# Patient Record
Sex: FEMALE | Race: WHITE | HISPANIC OR LATINO
[De-identification: names, ages, dates, MRNs, and addresses within clinical notes are randomized per-mention and may not be internally consistent; named-entity substitution may affect disease eponyms.]

---

## 2017-03-17 ENCOUNTER — RECORD ABSTRACTING (OUTPATIENT)
Age: 42
End: 2017-03-17

## 2017-03-17 DIAGNOSIS — Z86.59 PERSONAL HISTORY OF OTHER MENTAL AND BEHAVIORAL DISORDERS: ICD-10-CM

## 2017-03-17 DIAGNOSIS — Z87.440 PERSONAL HISTORY OF URINARY (TRACT) INFECTIONS: ICD-10-CM

## 2017-03-17 DIAGNOSIS — Z78.9 OTHER SPECIFIED HEALTH STATUS: ICD-10-CM

## 2017-03-17 DIAGNOSIS — Z87.898 PERSONAL HISTORY OF OTHER SPECIFIED CONDITIONS: ICD-10-CM

## 2017-03-17 DIAGNOSIS — N32.81 OVERACTIVE BLADDER: ICD-10-CM

## 2017-03-17 DIAGNOSIS — Z80.42 FAMILY HISTORY OF MALIGNANT NEOPLASM OF PROSTATE: ICD-10-CM

## 2017-03-17 DIAGNOSIS — Z86.32 PERSONAL HISTORY OF GESTATIONAL DIABETES: ICD-10-CM

## 2017-03-17 PROBLEM — Z00.00 ENCOUNTER FOR PREVENTIVE HEALTH EXAMINATION: Status: ACTIVE | Noted: 2017-03-17

## 2021-01-05 ENCOUNTER — OUTPATIENT (OUTPATIENT)
Dept: OUTPATIENT SERVICES | Facility: HOSPITAL | Age: 46
LOS: 1 days | Discharge: HOME | End: 2021-01-05

## 2021-01-05 ENCOUNTER — APPOINTMENT (OUTPATIENT)
Dept: PODIATRY | Facility: CLINIC | Age: 46
End: 2021-01-05
Payer: COMMERCIAL

## 2021-01-05 DIAGNOSIS — B35.3 TINEA PEDIS: ICD-10-CM

## 2021-01-05 DIAGNOSIS — L29.9 PRURITUS, UNSPECIFIED: ICD-10-CM

## 2021-01-05 DIAGNOSIS — M79.671 PAIN IN RIGHT FOOT: ICD-10-CM

## 2021-01-05 PROCEDURE — 99203 OFFICE O/P NEW LOW 30 MIN: CPT

## 2021-01-19 RX ORDER — CLOTRIMAZOLE 10 MG/G
1 CREAM TOPICAL 3 TIMES DAILY
Qty: 1 | Refills: 0 | Status: ACTIVE | COMMUNITY
Start: 2021-01-19 | End: 1900-01-01

## 2021-01-25 ENCOUNTER — APPOINTMENT (OUTPATIENT)
Dept: PODIATRY | Facility: CLINIC | Age: 46
End: 2021-01-25
Payer: COMMERCIAL

## 2021-01-25 ENCOUNTER — OUTPATIENT (OUTPATIENT)
Dept: OUTPATIENT SERVICES | Facility: HOSPITAL | Age: 46
LOS: 1 days | Discharge: HOME | End: 2021-01-25

## 2021-01-25 PROCEDURE — 97760 ORTHOTIC MGMT&TRAING 1ST ENC: CPT

## 2021-01-26 NOTE — HISTORY OF PRESENT ILLNESS
[FreeTextEntry1] : Patient presents with complaint Dry flaky skin plantar foot in moccasin distribution B/L consistent with Tinea Pedis\par \par Patient states she also has b/l foot pain at the  heel \par \par Deneies NVFC and No SOB \par

## 2021-01-26 NOTE — PROCEDURE
[] : Both right and left inserts were adjusted to accommodate deformity and offload pressure baring region. Modification  of inserts were performed with use of plastazote, PPT, cork and poron. [FreeTextEntry1] : pt with pain to bilateral forefoooot 2nd to callous buildup. callosities trimmed. ppt added to inserts to increase cushioning. initiated fabrication of orthotics. pt feet measured needs an increased shoe size. pt will return with new shoes/sneakers.

## 2021-01-26 NOTE — ASSESSMENT
[FreeTextEntry1] : Patient examined, history and chart reviewed\par rx clotrimazole to be applied as discussed in detail to b/l feet due to fungal infection\par refuses injection \par will send for inserts \par Follow up in 2 weeks

## 2021-01-26 NOTE — PHYSICAL EXAM
[General Appearance - Alert] : alert [General Appearance - In No Acute Distress] : in no acute distress [Delayed in the Right Toes] : capillary refills normal in right toes [Delayed in the Left Toes] : capillary refills normal in the left toes [2+] : left foot dorsalis pedis 2+ [de-identified] : pain of medial tubercle of calc on palpation  [Skin Turgor] : normal skin turgor [Skin Color & Pigmentation] : normal skin color and pigmentation [] : no rash [Skin Lesions] : no skin lesions [Foot Ulcer] : no foot ulcer [Skin Induration] : no skin induration [Sensation] : the sensory exam was normal to light touch and pinprick [FreeTextEntry1] : dry bilateral plantar patches  [No Focal Deficits] : no focal deficits [Deep Tendon Reflexes (DTR)] : deep tendon reflexes were 2+ and symmetric [Motor Exam] : the motor exam was normal [Oriented To Time, Place, And Person] : oriented to person, place, and time [Impaired Insight] : insight and judgment were intact [Affect] : the affect was normal

## 2021-02-08 ENCOUNTER — APPOINTMENT (OUTPATIENT)
Dept: PODIATRY | Facility: CLINIC | Age: 46
End: 2021-02-08

## 2021-04-12 ENCOUNTER — INPATIENT (INPATIENT)
Facility: HOSPITAL | Age: 46
LOS: 7 days | Discharge: HOME HEALTH | End: 2021-04-20
Attending: INTERNAL MEDICINE | Admitting: INTERNAL MEDICINE
Payer: COMMERCIAL

## 2021-04-12 VITALS
WEIGHT: 240.08 LBS | TEMPERATURE: 99 F | DIASTOLIC BLOOD PRESSURE: 87 MMHG | HEART RATE: 107 BPM | RESPIRATION RATE: 20 BRPM | SYSTOLIC BLOOD PRESSURE: 140 MMHG | OXYGEN SATURATION: 93 %

## 2021-04-12 LAB
ALBUMIN SERPL ELPH-MCNC: 3.8 G/DL — SIGNIFICANT CHANGE UP (ref 3.5–5.2)
ALP SERPL-CCNC: 53 U/L — SIGNIFICANT CHANGE UP (ref 30–115)
ALT FLD-CCNC: 25 U/L — SIGNIFICANT CHANGE UP (ref 0–41)
ANION GAP SERPL CALC-SCNC: 11 MMOL/L — SIGNIFICANT CHANGE UP (ref 7–14)
APTT BLD: 25.7 SEC — LOW (ref 27–39.2)
AST SERPL-CCNC: 39 U/L — SIGNIFICANT CHANGE UP (ref 0–41)
BASOPHILS # BLD AUTO: 0.01 K/UL — SIGNIFICANT CHANGE UP (ref 0–0.2)
BASOPHILS NFR BLD AUTO: 0.2 % — SIGNIFICANT CHANGE UP (ref 0–1)
BILIRUB SERPL-MCNC: <0.2 MG/DL — SIGNIFICANT CHANGE UP (ref 0.2–1.2)
BUN SERPL-MCNC: 12 MG/DL — SIGNIFICANT CHANGE UP (ref 10–20)
CALCIUM SERPL-MCNC: 8.6 MG/DL — SIGNIFICANT CHANGE UP (ref 8.5–10.1)
CHLORIDE SERPL-SCNC: 104 MMOL/L — SIGNIFICANT CHANGE UP (ref 98–110)
CK SERPL-CCNC: 66 U/L — SIGNIFICANT CHANGE UP (ref 0–225)
CK SERPL-CCNC: 83 U/L — SIGNIFICANT CHANGE UP (ref 0–225)
CO2 SERPL-SCNC: 22 MMOL/L — SIGNIFICANT CHANGE UP (ref 17–32)
CREAT SERPL-MCNC: 0.7 MG/DL — SIGNIFICANT CHANGE UP (ref 0.7–1.5)
D DIMER BLD IA.RAPID-MCNC: 123 NG/ML DDU — SIGNIFICANT CHANGE UP (ref 0–230)
EOSINOPHIL # BLD AUTO: 0 K/UL — SIGNIFICANT CHANGE UP (ref 0–0.7)
EOSINOPHIL NFR BLD AUTO: 0 % — SIGNIFICANT CHANGE UP (ref 0–8)
GLUCOSE BLDC GLUCOMTR-MCNC: 192 MG/DL — HIGH (ref 70–99)
GLUCOSE SERPL-MCNC: 184 MG/DL — HIGH (ref 70–99)
HCG SERPL QL: NEGATIVE — SIGNIFICANT CHANGE UP
HCT VFR BLD CALC: 40.8 % — SIGNIFICANT CHANGE UP (ref 37–47)
HGB BLD-MCNC: 13.2 G/DL — SIGNIFICANT CHANGE UP (ref 12–16)
IMM GRANULOCYTES NFR BLD AUTO: 0.2 % — SIGNIFICANT CHANGE UP (ref 0.1–0.3)
INR BLD: 1.07 RATIO — SIGNIFICANT CHANGE UP (ref 0.65–1.3)
LACTATE SERPL-SCNC: 1.2 MMOL/L — SIGNIFICANT CHANGE UP (ref 0.7–2)
LACTATE SERPL-SCNC: 1.3 MMOL/L — SIGNIFICANT CHANGE UP (ref 0.7–2)
LDH SERPL L TO P-CCNC: 286 — HIGH (ref 50–242)
LYMPHOCYTES # BLD AUTO: 0.97 K/UL — LOW (ref 1.2–3.4)
LYMPHOCYTES # BLD AUTO: 19.6 % — LOW (ref 20.5–51.1)
MCHC RBC-ENTMCNC: 28.8 PG — SIGNIFICANT CHANGE UP (ref 27–31)
MCHC RBC-ENTMCNC: 32.4 G/DL — SIGNIFICANT CHANGE UP (ref 32–37)
MCV RBC AUTO: 88.9 FL — SIGNIFICANT CHANGE UP (ref 81–99)
MONOCYTES # BLD AUTO: 0.17 K/UL — SIGNIFICANT CHANGE UP (ref 0.1–0.6)
MONOCYTES NFR BLD AUTO: 3.4 % — SIGNIFICANT CHANGE UP (ref 1.7–9.3)
NEUTROPHILS # BLD AUTO: 3.79 K/UL — SIGNIFICANT CHANGE UP (ref 1.4–6.5)
NEUTROPHILS NFR BLD AUTO: 76.6 % — HIGH (ref 42.2–75.2)
NRBC # BLD: 0 /100 WBCS — SIGNIFICANT CHANGE UP (ref 0–0)
NT-PROBNP SERPL-SCNC: 15 PG/ML — SIGNIFICANT CHANGE UP (ref 0–300)
PLATELET # BLD AUTO: 144 K/UL — SIGNIFICANT CHANGE UP (ref 130–400)
POTASSIUM SERPL-MCNC: 4.4 MMOL/L — SIGNIFICANT CHANGE UP (ref 3.5–5)
POTASSIUM SERPL-SCNC: 4.4 MMOL/L — SIGNIFICANT CHANGE UP (ref 3.5–5)
PROT SERPL-MCNC: 7 G/DL — SIGNIFICANT CHANGE UP (ref 6–8)
PROTHROM AB SERPL-ACNC: 12.3 SEC — SIGNIFICANT CHANGE UP (ref 9.95–12.87)
RBC # BLD: 4.59 M/UL — SIGNIFICANT CHANGE UP (ref 4.2–5.4)
RBC # FLD: 13.6 % — SIGNIFICANT CHANGE UP (ref 11.5–14.5)
SARS-COV-2 RNA SPEC QL NAA+PROBE: DETECTED
SODIUM SERPL-SCNC: 137 MMOL/L — SIGNIFICANT CHANGE UP (ref 135–146)
TROPONIN T SERPL-MCNC: <0.01 NG/ML — SIGNIFICANT CHANGE UP
TROPONIN T SERPL-MCNC: <0.01 NG/ML — SIGNIFICANT CHANGE UP
WBC # BLD: 4.95 K/UL — SIGNIFICANT CHANGE UP (ref 4.8–10.8)
WBC # FLD AUTO: 4.95 K/UL — SIGNIFICANT CHANGE UP (ref 4.8–10.8)

## 2021-04-12 PROCEDURE — 93010 ELECTROCARDIOGRAM REPORT: CPT

## 2021-04-12 PROCEDURE — 99285 EMERGENCY DEPT VISIT HI MDM: CPT

## 2021-04-12 PROCEDURE — 71045 X-RAY EXAM CHEST 1 VIEW: CPT | Mod: 26

## 2021-04-12 PROCEDURE — 99223 1ST HOSP IP/OBS HIGH 75: CPT

## 2021-04-12 RX ORDER — DEXTROSE 50 % IN WATER 50 %
15 SYRINGE (ML) INTRAVENOUS ONCE
Refills: 0 | Status: DISCONTINUED | OUTPATIENT
Start: 2021-04-12 | End: 2021-04-20

## 2021-04-12 RX ORDER — ACETAMINOPHEN 500 MG
975 TABLET ORAL ONCE
Refills: 0 | Status: COMPLETED | OUTPATIENT
Start: 2021-04-12 | End: 2021-04-12

## 2021-04-12 RX ORDER — CHLORHEXIDINE GLUCONATE 213 G/1000ML
1 SOLUTION TOPICAL
Refills: 0 | Status: DISCONTINUED | OUTPATIENT
Start: 2021-04-12 | End: 2021-04-20

## 2021-04-12 RX ORDER — ENOXAPARIN SODIUM 100 MG/ML
40 INJECTION SUBCUTANEOUS EVERY 12 HOURS
Refills: 0 | Status: DISCONTINUED | OUTPATIENT
Start: 2021-04-12 | End: 2021-04-20

## 2021-04-12 RX ORDER — SODIUM CHLORIDE 9 MG/ML
1000 INJECTION, SOLUTION INTRAVENOUS
Refills: 0 | Status: DISCONTINUED | OUTPATIENT
Start: 2021-04-12 | End: 2021-04-20

## 2021-04-12 RX ORDER — REMDESIVIR 5 MG/ML
INJECTION INTRAVENOUS
Refills: 0 | Status: COMPLETED | OUTPATIENT
Start: 2021-04-12 | End: 2021-04-16

## 2021-04-12 RX ORDER — INSULIN LISPRO 100/ML
8 VIAL (ML) SUBCUTANEOUS
Refills: 0 | Status: DISCONTINUED | OUTPATIENT
Start: 2021-04-12 | End: 2021-04-20

## 2021-04-12 RX ORDER — GLUCAGON INJECTION, SOLUTION 0.5 MG/.1ML
1 INJECTION, SOLUTION SUBCUTANEOUS ONCE
Refills: 0 | Status: DISCONTINUED | OUTPATIENT
Start: 2021-04-12 | End: 2021-04-20

## 2021-04-12 RX ORDER — GUAIFENESIN/DEXTROMETHORPHAN 600MG-30MG
10 TABLET, EXTENDED RELEASE 12 HR ORAL EVERY 4 HOURS
Refills: 0 | Status: DISCONTINUED | OUTPATIENT
Start: 2021-04-12 | End: 2021-04-20

## 2021-04-12 RX ORDER — DEXTROSE 50 % IN WATER 50 %
25 SYRINGE (ML) INTRAVENOUS ONCE
Refills: 0 | Status: DISCONTINUED | OUTPATIENT
Start: 2021-04-12 | End: 2021-04-20

## 2021-04-12 RX ORDER — CITALOPRAM 10 MG/1
10 TABLET, FILM COATED ORAL DAILY
Refills: 0 | Status: DISCONTINUED | OUTPATIENT
Start: 2021-04-12 | End: 2021-04-20

## 2021-04-12 RX ORDER — DEXAMETHASONE 0.5 MG/5ML
6 ELIXIR ORAL DAILY
Refills: 0 | Status: DISCONTINUED | OUTPATIENT
Start: 2021-04-12 | End: 2021-04-20

## 2021-04-12 RX ORDER — INSULIN GLARGINE 100 [IU]/ML
25 INJECTION, SOLUTION SUBCUTANEOUS AT BEDTIME
Refills: 0 | Status: DISCONTINUED | OUTPATIENT
Start: 2021-04-12 | End: 2021-04-20

## 2021-04-12 RX ORDER — ACETAMINOPHEN 500 MG
650 TABLET ORAL EVERY 4 HOURS
Refills: 0 | Status: DISCONTINUED | OUTPATIENT
Start: 2021-04-12 | End: 2021-04-13

## 2021-04-12 RX ORDER — LOSARTAN POTASSIUM 100 MG/1
50 TABLET, FILM COATED ORAL DAILY
Refills: 0 | Status: DISCONTINUED | OUTPATIENT
Start: 2021-04-12 | End: 2021-04-20

## 2021-04-12 RX ORDER — REMDESIVIR 5 MG/ML
100 INJECTION INTRAVENOUS EVERY 24 HOURS
Refills: 0 | Status: COMPLETED | OUTPATIENT
Start: 2021-04-13 | End: 2021-04-16

## 2021-04-12 RX ORDER — PANTOPRAZOLE SODIUM 20 MG/1
40 TABLET, DELAYED RELEASE ORAL
Refills: 0 | Status: DISCONTINUED | OUTPATIENT
Start: 2021-04-12 | End: 2021-04-20

## 2021-04-12 RX ORDER — REMDESIVIR 5 MG/ML
200 INJECTION INTRAVENOUS EVERY 24 HOURS
Refills: 0 | Status: COMPLETED | OUTPATIENT
Start: 2021-04-12 | End: 2021-04-12

## 2021-04-12 RX ORDER — INSULIN LISPRO 100/ML
VIAL (ML) SUBCUTANEOUS
Refills: 0 | Status: DISCONTINUED | OUTPATIENT
Start: 2021-04-12 | End: 2021-04-20

## 2021-04-12 RX ORDER — DEXTROSE 50 % IN WATER 50 %
12.5 SYRINGE (ML) INTRAVENOUS ONCE
Refills: 0 | Status: DISCONTINUED | OUTPATIENT
Start: 2021-04-12 | End: 2021-04-20

## 2021-04-12 RX ADMIN — Medication 975 MILLIGRAM(S): at 19:00

## 2021-04-12 RX ADMIN — REMDESIVIR 500 MILLIGRAM(S): 5 INJECTION INTRAVENOUS at 22:26

## 2021-04-12 RX ADMIN — Medication 975 MILLIGRAM(S): at 18:22

## 2021-04-12 NOTE — ED PROVIDER NOTE - CLINICAL SUMMARY MEDICAL DECISION MAKING FREE TEXT BOX
46 yo female PMH Dm, HTN  c/o cough and SOB since yesterday.  Patient was exposed to COVID-19 9 days ago when a family friend came over for a visit.  Patient developed symptoms a few days later which included fever and headache, she tested positive about a week ago, yesterday developed  cough and SOB.  Reports diarrhea as well, but this is after her mother gave her a laxative.  Denies hemoptysis, sore throat, abdominal pain, no leg pain swelling., no N or V.   Well-nourished middle aged female in  NAD, head AT/NC, PERRL, pink conjunctivae,  mmm, nml oropharynx, nml phonation without drooling or trismus, supple neck without midline spine ttp, mild tachypnes, no accessory muscle use, speaking full sentences, equal air entry, b/l inspiratory crackles, tachycardia, well-perfused extremities, distal pulses intact, abdomen soft, NT/ND, BS present in all quadrants, no midline spine or CVA ttp, no leg edema or unilateral calf swelling, A&Ox3, no focal neuro deficits, nml mood and affect.  Oxygen saturation at rest is 88-89% on RA, patient has tachypnea, needs admission,  put in 2L oxygen via NC.

## 2021-04-12 NOTE — H&P ADULT - ATTENDING COMMENTS
46 YO F with a PMH of HTN and COVID19+ (4/6) who presents to the hospital with a c/o progressively worsening SOB for the past x 1 week. Associated with fevers and non-productive cough. Denies any runny nose, sore throat, rashes, CP, palpitations, LE swelling, N/V/D, ABD pain, and dysuria. +/- sick contacts. +/- recent travel. In the ED, Chest X-ray with B/L airspace opacities. Hypoxic to 89% on RA, placed on NC. Isolated and COVID19 swab was positive.     Physical exam shows pt in NAD. VSS, afebrile, not hypoxic on 3L NC. A&Ox3. Non-focal neuro exam. Muscle strength/sensation intact. CTA B/L with no W/C/R. RRR, no M/G/R. ABD is soft and non-tender, normoactive BSs. LEs without swelling. No rashes. Labs and radiology as above.     SOB + Fevers + Cough due to COVID19 pneumonia + acute hypoxic respiratory failure, sepsis present on admission (HR + Temp + source + ebnd organ [AHRF]). - recent travel. - COVID19 contact. Admit to COVID19 isolation unit. Send Coags, CRP, procal, D-dimer, and LDH. Trend CBC, Ck, and LFTs. Send ferritin and fibrinogen. FU official Chest XR report. IV Steroids. Remdesivir/Plasma protocol. Plasma Abs. APAP PRN. Anti-tussives PRN. Supplemental O2 PRN. Prone pt as tolerated. AC as per [facility] COVID protocol. ID Consult.     Hyperglycemia. A1c. FSs. Insulin PRN.     Hx of HTN. Restart home meds, except as stated above. DVT PPX. Inform PCP of pt's admission to hospital. My note supersedes the residents note.

## 2021-04-12 NOTE — H&P ADULT - NSHPLABSRESULTS_GEN_ALL_CORE
13.2   4.95  )-----------( 144      ( 12 Apr 2021 15:00 )             40.8     04-12    137  |  104  |  12  ----------------------------<  184<H>  4.4   |  22  |  0.7    Ca    8.6      12 Apr 2021 15:00    TPro  7.0  /  Alb  3.8  /  TBili  <0.2  /  DBili  x   /  AST  39  /  ALT  25  /  AlkPhos  53  04-12    D-Dimer Assay, Quantitative: 123: Manufacturers recommended Cut off for VTE is 230 ng/ml D-DU ng/mL DDU (04.12.21 @ 15:00)    PT/INR - ( 12 Apr 2021 15:00 )   PT: 12.30 sec;   INR: 1.07 ratio         PTT - ( 12 Apr 2021 15:00 )  PTT:25.7 sec    Lactate, Blood: 1.3 mmol/L (04.12.21 @ 15:00)    Creatine Kinase, Serum: 83: Hemolyzed. Interpret with caution U/L (04.12.21 @ 15:00)    Troponin T, Serum: <0.01: Hemolyzed. Interpret with caution ng/mL (04.12.21 @ 15:00)    Serum Pro-Brain Natriuretic Peptide: 15 pg/mL (04.12.21 @ 15:00)    COVID-19 PCR: Detected: You can help in the fight against COVID-19. [facility] may contact  you to see if you are interested in voluntarily participating in one of  our clinical trials.  This test has been validated by [employer] to be accurate;  though it has not been FDA cleared/approved by the usual pathway  As with all laboratory test, results should be correlated with clinical  findings.  https://www.fda.gov/media/951876/download  https://www.fda.gov/media/665686/download (04.12.21 @ 14:33)    < from: 12 Lead ECG (04.12.21 @ 15:16) >    Diagnosis Line Sinus tachycardia  Right axis deviation  Cannot rule out Anterior infarct , age undetermined  Abnormal ECG    < end of copied text >

## 2021-04-12 NOTE — ED PROVIDER NOTE - OBJECTIVE STATEMENT
46 y/o F PMHx DM, HTN presents to ED with worsening COVID 19 sx. Pt was diagnosed with COVID on 4/6/21. Pt symptoms include diffuse headache, myalgias, cough, congestion, diarrhea, fevers. Pt reports pleuritic chest pain and SOB worsening over past 24 hours. Pt hypoxic to 88% on RA in ED. No hemoptysis, leg swelling, OCP (pt has IUD).

## 2021-04-12 NOTE — ED PROVIDER NOTE - ATTENDING CONTRIBUTION TO CARE
44 yo female PMH Dm, HTN  c/o cough and SOB since yesterday.  Patient was exposed to COVID-19 9 days ago when a family friend came over for a visit.  Patient developed symptoms a few days later which included fever and headache, she tested positive about a week ago, yesterday developed  cough and SOB.  Reports diarrhea as well, but this is after her mother gave her a laxative.  Denies hemoptysis, sore throat, abdominal pain, no leg pain swelling., no N or V.   Well-nourished middle aged female in  NAD, head AT/NC, PERRL, pink conjunctivae,  mmm, nml oropharynx, nml phonation without drooling or trismus, supple neck without midline spine ttp, mild tachypnes, no accessory muscle use, speaking full sentences, equal air entry, b/l inspiratory crackles, tachycardia, well-perfused extremities, distal pulses intact, abdomen soft, NT/ND, BS present in all quadrants, no midline spine or CVA ttp, no leg edema or unilateral calf swelling, A&Ox3, no focal neuro deficits, nml mood and affect.  Oxygen saturation at rest is 88-89% on RA, patient has tachypnea, needs admission,  put in 2L oxygen via NC.

## 2021-04-12 NOTE — H&P ADULT - NSHPPHYSICALEXAM_GEN_ALL_CORE
PHYSICAL EXAM:  GENERAL: NAD, lying in bed comfortably, on 2L NC  HEAD:  Atraumatic, Normocephalic  EYES: EOMI, PERRLA, conjunctiva and sclera clear  ENT: Moist mucous membranes  NECK: Supple, No JVD  CHEST/LUNG: b/l rhonchi  HEART: tachycardic, regular rhythm; No murmurs, rubs, or gallops  ABDOMEN: Bowel sounds present; Soft, Nontender, Nondistended. No hepatomegaly  EXTREMITIES:  2+ Peripheral Pulses, brisk capillary refill. No clubbing, cyanosis, or edema  NERVOUS SYSTEM:  Alert & Oriented X3, speech clear. No deficits   MSK: FROM all 4 extremities, full and equal strength  SKIN: No rashes or lesions

## 2021-04-12 NOTE — H&P ADULT - HISTORY OF PRESENT ILLNESS
45 Y F with pmh, HTN, COVID19 positive on 4/6/21 presents to ED with worsening shortness of breath. Per patient, she had exposure to COVID on 4/3, ended up testing positive on 4/6. On 4/6, she began having cough, congestion, myalgias, fatigue, and subjective fevers. Went to PMD, started on z-pack (completed course) which helped her feel better slightly. However, last night she began having worsening shortness of breath and dry cough, to the point that she was vomiting from coughing, so decided to come to ED. Denies chills, chest pain, palpitations, abdominal pain. Reports diarrhea, but only after she took a laxative.     ED Course:  T 99.4F, P 107, /87, R 20, S 93% on RA at rest. Desaturates to 89% on RA on ambulation, put on 3L NC and improved to 99%.

## 2021-04-12 NOTE — H&P ADULT - ASSESSMENT
45 Y F with pmh, HTN, COVID19 positive on 4/6/21 presents to ED with worsening shortness of breath. Admitted for acute hypoxemic respiratory failure due to COVID19 PNA.    #Acute hypoxemic respiratory failure due to COVID19 PNA  -saturating well on 2L NC   -Patient started on Decadron 6mg IV daily 10more days  -Remdesivir 200mg now then 100mg taper   -ID consult  -Continue airborne isolation   -Please monitor O2 sat q8 and vitals  -Please follow up inflammatory markers (D-dimer, CRP, ferritin) Q48-72H if clinically worsening  -Incentive spirometry at bedside  -Titrate supplemental O2 to maintain SpO2 92-96%  -Tylenol PRN  -mucinex prn    #HTN  -continue losartan    #DM  -basal/bolus  -a1c  -avoid hypoglycemia    #DVT PPx- LVX 40mg sq bid  #GI PPx- Protonix 40mg po qam  #Diet- DASH/TLC/CC  #CHG  #Activity- AAT  #Dispo- Acute  #Code- FULL

## 2021-04-12 NOTE — ED PROVIDER NOTE - NS ED ROS FT
Review of Systems:  CONSTITUTIONAL: +fever, No diaphoresis   SKIN: No rash  HEMATOLOGIC: No abnormal bleeding or bruising  EYES: No eye pain, No blurred vision  ENT: No sore throat, No neck pain, No rhinorrhea, +congestion   RESPIRATORY: +shortness of breath, +cough  CARDIAC: +chest pain, No palpitations  GI: No abdominal pain, No nausea, No vomiting, +diarrhea, No constipation, No bright red blood per rectum or melena. No flank pain  : No dysuria, frequency, hematuria.   MUSCULOSKELETAL: No joint paint, No swelling, No back pain, +myalgias   NEUROLOGIC: No numbness, No focal weakness, No headache, No dizziness  All other systems negative, unless specified in HPI

## 2021-04-13 LAB
A1C WITH ESTIMATED AVERAGE GLUCOSE RESULT: 7.1 % — HIGH (ref 4–5.6)
ABO RH CONFIRMATION: SIGNIFICANT CHANGE UP
ALBUMIN SERPL ELPH-MCNC: 3.5 G/DL — SIGNIFICANT CHANGE UP (ref 3.5–5.2)
ALP SERPL-CCNC: 48 U/L — SIGNIFICANT CHANGE UP (ref 30–115)
ALT FLD-CCNC: 24 U/L — SIGNIFICANT CHANGE UP (ref 0–41)
ANION GAP SERPL CALC-SCNC: 13 MMOL/L — SIGNIFICANT CHANGE UP (ref 7–14)
ANISOCYTOSIS BLD QL: SLIGHT — SIGNIFICANT CHANGE UP
AST SERPL-CCNC: 36 U/L — SIGNIFICANT CHANGE UP (ref 0–41)
BASOPHILS # BLD AUTO: 0 K/UL — SIGNIFICANT CHANGE UP (ref 0–0.2)
BASOPHILS NFR BLD AUTO: 0 % — SIGNIFICANT CHANGE UP (ref 0–1)
BILIRUB SERPL-MCNC: 0.2 MG/DL — SIGNIFICANT CHANGE UP (ref 0.2–1.2)
BLD GP AB SCN SERPL QL: SIGNIFICANT CHANGE UP
BUN SERPL-MCNC: 10 MG/DL — SIGNIFICANT CHANGE UP (ref 10–20)
CALCIUM SERPL-MCNC: 8 MG/DL — LOW (ref 8.5–10.1)
CHLORIDE SERPL-SCNC: 102 MMOL/L — SIGNIFICANT CHANGE UP (ref 98–110)
CO2 SERPL-SCNC: 21 MMOL/L — SIGNIFICANT CHANGE UP (ref 17–32)
COVID-19 SPIKE DOMAIN AB INTERP: NEGATIVE — SIGNIFICANT CHANGE UP
COVID-19 SPIKE DOMAIN ANTIBODY RESULT: 0.4 U/ML — SIGNIFICANT CHANGE UP
CREAT SERPL-MCNC: 0.7 MG/DL — SIGNIFICANT CHANGE UP (ref 0.7–1.5)
CRP SERPL-MCNC: 52 MG/L — HIGH
EOSINOPHIL # BLD AUTO: 0 K/UL — SIGNIFICANT CHANGE UP (ref 0–0.7)
EOSINOPHIL NFR BLD AUTO: 0 % — SIGNIFICANT CHANGE UP (ref 0–8)
ESTIMATED AVERAGE GLUCOSE: 157 MG/DL — HIGH (ref 68–114)
FERRITIN SERPL-MCNC: 566 NG/ML — HIGH (ref 15–150)
GIANT PLATELETS BLD QL SMEAR: PRESENT — SIGNIFICANT CHANGE UP
GLUCOSE BLDC GLUCOMTR-MCNC: 131 MG/DL — HIGH (ref 70–99)
GLUCOSE BLDC GLUCOMTR-MCNC: 183 MG/DL — HIGH (ref 70–99)
GLUCOSE BLDC GLUCOMTR-MCNC: 199 MG/DL — HIGH (ref 70–99)
GLUCOSE BLDC GLUCOMTR-MCNC: 256 MG/DL — HIGH (ref 70–99)
GLUCOSE BLDC GLUCOMTR-MCNC: 276 MG/DL — HIGH (ref 70–99)
GLUCOSE SERPL-MCNC: 200 MG/DL — HIGH (ref 70–99)
HCT VFR BLD CALC: 39.4 % — SIGNIFICANT CHANGE UP (ref 37–47)
HGB BLD-MCNC: 12.5 G/DL — SIGNIFICANT CHANGE UP (ref 12–16)
HYPOCHROMIA BLD QL: SLIGHT — SIGNIFICANT CHANGE UP
LYMPHOCYTES # BLD AUTO: 0.63 K/UL — LOW (ref 1.2–3.4)
LYMPHOCYTES # BLD AUTO: 14.2 % — LOW (ref 20.5–51.1)
MAGNESIUM SERPL-MCNC: 1.8 MG/DL — SIGNIFICANT CHANGE UP (ref 1.8–2.4)
MANUAL SMEAR VERIFICATION: SIGNIFICANT CHANGE UP
MCHC RBC-ENTMCNC: 28.5 PG — SIGNIFICANT CHANGE UP (ref 27–31)
MCHC RBC-ENTMCNC: 31.7 G/DL — LOW (ref 32–37)
MCV RBC AUTO: 90 FL — SIGNIFICANT CHANGE UP (ref 81–99)
MICROCYTES BLD QL: SLIGHT — SIGNIFICANT CHANGE UP
MONOCYTES # BLD AUTO: 0.16 K/UL — SIGNIFICANT CHANGE UP (ref 0.1–0.6)
MONOCYTES NFR BLD AUTO: 3.6 % — SIGNIFICANT CHANGE UP (ref 1.7–9.3)
NEUTROPHILS # BLD AUTO: 3.49 K/UL — SIGNIFICANT CHANGE UP (ref 1.4–6.5)
NEUTROPHILS NFR BLD AUTO: 75.2 % — SIGNIFICANT CHANGE UP (ref 42.2–75.2)
NEUTS BAND # BLD: 3.5 % — SIGNIFICANT CHANGE UP (ref 0–6)
PLAT MORPH BLD: NORMAL — SIGNIFICANT CHANGE UP
PLATELET # BLD AUTO: 136 K/UL — SIGNIFICANT CHANGE UP (ref 130–400)
POLYCHROMASIA BLD QL SMEAR: SLIGHT — SIGNIFICANT CHANGE UP
POTASSIUM SERPL-MCNC: 3.7 MMOL/L — SIGNIFICANT CHANGE UP (ref 3.5–5)
POTASSIUM SERPL-SCNC: 3.7 MMOL/L — SIGNIFICANT CHANGE UP (ref 3.5–5)
PROCALCITONIN SERPL-MCNC: 0.11 NG/ML — HIGH (ref 0.02–0.1)
PROT SERPL-MCNC: 6.5 G/DL — SIGNIFICANT CHANGE UP (ref 6–8)
RBC # BLD: 4.38 M/UL — SIGNIFICANT CHANGE UP (ref 4.2–5.4)
RBC # FLD: 13.6 % — SIGNIFICANT CHANGE UP (ref 11.5–14.5)
RBC BLD AUTO: ABNORMAL
SARS-COV-2 IGG+IGM SERPL QL IA: 0.4 U/ML — SIGNIFICANT CHANGE UP
SARS-COV-2 IGG+IGM SERPL QL IA: NEGATIVE — SIGNIFICANT CHANGE UP
SMUDGE CELLS # BLD: PRESENT — SIGNIFICANT CHANGE UP
SODIUM SERPL-SCNC: 136 MMOL/L — SIGNIFICANT CHANGE UP (ref 135–146)
VARIANT LYMPHS # BLD: 3.5 % — SIGNIFICANT CHANGE UP (ref 0–5)
WBC # BLD: 4.43 K/UL — LOW (ref 4.8–10.8)
WBC # FLD AUTO: 4.43 K/UL — LOW (ref 4.8–10.8)

## 2021-04-13 PROCEDURE — 99233 SBSQ HOSP IP/OBS HIGH 50: CPT

## 2021-04-13 PROCEDURE — 71045 X-RAY EXAM CHEST 1 VIEW: CPT | Mod: 26

## 2021-04-13 RX ORDER — IBUPROFEN 200 MG
400 TABLET ORAL ONCE
Refills: 0 | Status: COMPLETED | OUTPATIENT
Start: 2021-04-13 | End: 2021-04-13

## 2021-04-13 RX ORDER — ACETAMINOPHEN 500 MG
650 TABLET ORAL EVERY 6 HOURS
Refills: 0 | Status: DISCONTINUED | OUTPATIENT
Start: 2021-04-13 | End: 2021-04-20

## 2021-04-13 RX ADMIN — Medication 400 MILLIGRAM(S): at 21:53

## 2021-04-13 RX ADMIN — ENOXAPARIN SODIUM 40 MILLIGRAM(S): 100 INJECTION SUBCUTANEOUS at 17:12

## 2021-04-13 RX ADMIN — Medication 6 MILLIGRAM(S): at 05:27

## 2021-04-13 RX ADMIN — REMDESIVIR 500 MILLIGRAM(S): 5 INJECTION INTRAVENOUS at 21:52

## 2021-04-13 RX ADMIN — Medication 8 UNIT(S): at 11:41

## 2021-04-13 RX ADMIN — Medication 650 MILLIGRAM(S): at 06:30

## 2021-04-13 RX ADMIN — ENOXAPARIN SODIUM 40 MILLIGRAM(S): 100 INJECTION SUBCUTANEOUS at 05:27

## 2021-04-13 RX ADMIN — CITALOPRAM 10 MILLIGRAM(S): 10 TABLET, FILM COATED ORAL at 11:41

## 2021-04-13 RX ADMIN — Medication 400 MILLIGRAM(S): at 22:23

## 2021-04-13 RX ADMIN — Medication 650 MILLIGRAM(S): at 02:48

## 2021-04-13 RX ADMIN — Medication 3: at 17:12

## 2021-04-13 RX ADMIN — CHLORHEXIDINE GLUCONATE 1 APPLICATION(S): 213 SOLUTION TOPICAL at 05:27

## 2021-04-13 RX ADMIN — Medication 650 MILLIGRAM(S): at 08:00

## 2021-04-13 RX ADMIN — INSULIN GLARGINE 25 UNIT(S): 100 INJECTION, SOLUTION SUBCUTANEOUS at 22:00

## 2021-04-13 RX ADMIN — Medication 3: at 11:41

## 2021-04-13 RX ADMIN — LOSARTAN POTASSIUM 50 MILLIGRAM(S): 100 TABLET, FILM COATED ORAL at 05:28

## 2021-04-13 RX ADMIN — PANTOPRAZOLE SODIUM 40 MILLIGRAM(S): 20 TABLET, DELAYED RELEASE ORAL at 05:28

## 2021-04-13 RX ADMIN — Medication 650 MILLIGRAM(S): at 02:18

## 2021-04-13 RX ADMIN — Medication 8 UNIT(S): at 17:11

## 2021-04-13 RX ADMIN — Medication 1: at 07:42

## 2021-04-13 RX ADMIN — Medication 8 UNIT(S): at 07:42

## 2021-04-13 NOTE — CONSULT NOTE ADULT - SUBJECTIVE AND OBJECTIVE BOX
LIZ PAULSON  45y, Female  Allergy: No Known Allergies      CHIEF COMPLAINT:   shortness of breath (12 Apr 2021 18:16)      LOS  1d    HPI  HPI:  44 yo F with  HTN, COVID19 positive on 4/6/21 presents to ED with worsening shortness of breath. Per patient, she had exposure to COVID on 4/3, ended up testing positive on 4/6. On 4/6, she began having cough, congestion, myalgias, fatigue, and subjective fevers. Went to PMD, started on z-pack (completed course) which helped her feel better slightly. However, last night she began having worsening shortness of breath and dry cough, to the point that she was vomiting from coughing, so decided to come to ED. Denies chills, chest pain, palpitations, abdominal pain. Reports diarrhea, but only after she took a laxative.     ED Course:  T 99.4F, P 107, /87, R 20, S 93% on RA at rest. Desaturates to 89% on RA on ambulation, put on 3L NC and improved to 99%.  (12 Apr 2021 18:16)      INFECTIOUS DISEASE HISTORY:  Satting 90 % RA  CXR bilateral opacities   C-Reactive Protein, Serum: 52 mg/L (04-12-21 @ 15:00)  D-Dimer Assay, Quantitative: 123 ng/mL DDU (04-12-21 @ 15:00)      PMH  PAST MEDICAL & SURGICAL HISTORY:  Hypertension    Diabetes mellitus        FAMILY HISTORY      SOCIAL HISTORY  Social History:  lives with boyfriend and 2 kids  remote history of smoking as teen  social alcohol  no drugs (12 Apr 2021 18:16)        ROS  General: Denies rigors, nightsweats  HEENT: Denies headache, rhinorrhea, sore throat, eye pain  CV: Denies CP, palpitations  PULM: Denies wheezing, hemoptysis  GI: Denies hematemesis, hematochezia, melena  : Denies discharge, hematuria  MSK: Denies arthralgias, myalgias  SKIN: Denies rash, lesions  NEURO: Denies paresthesias, weakness  PSYCH: Denies depression, anxiety    VITALS:  T(F): 99.2, Max: 101.5 (04-13-21 @ 02:18)  HR: 102  BP: 123/63  RR: 18Vital Signs Last 24 Hrs  T(C): 37.3 (13 Apr 2021 07:11), Max: 38.6 (12 Apr 2021 17:54)  T(F): 99.2 (13 Apr 2021 07:11), Max: 101.5 (13 Apr 2021 02:18)  HR: 102 (13 Apr 2021 05:00) (83 - 112)  BP: 123/63 (13 Apr 2021 05:00) (123/63 - 140/87)  BP(mean): --  RR: 18 (13 Apr 2021 05:00) (18 - 22)  SpO2: 90% (13 Apr 2021 05:00) (89% - 99%)      TESTS & MEASUREMENTS:                        13.2   4.95  )-----------( 144      ( 12 Apr 2021 15:00 )             40.8     04-12    137  |  104  |  12  ----------------------------<  184<H>  4.4   |  22  |  0.7    Ca    8.6      12 Apr 2021 15:00    TPro  7.0  /  Alb  3.8  /  TBili  <0.2  /  DBili  x   /  AST  39  /  ALT  25  /  AlkPhos  53  04-12    eGFR if Non African American: 105 mL/min/1.73M2 (04-12-21 @ 15:00)  eGFR if African American: 122 mL/min/1.73M2 (04-12-21 @ 15:00)    LIVER FUNCTIONS - ( 12 Apr 2021 15:00 )  Alb: 3.8 g/dL / Pro: 7.0 g/dL / ALK PHOS: 53 U/L / ALT: 25 U/L / AST: 39 U/L / GGT: x                 Lactate, Blood: 1.2 mmol/L (04-12-21 @ 21:55)  Lactate, Blood: 1.3 mmol/L (04-12-21 @ 15:00)      INFECTIOUS DISEASES TESTING  COVID-19 PCR: Detected (04-12-21 @ 14:33)      INFLAMMATORY MARKERS  C-Reactive Protein, Serum: 52 mg/L (04-12-21 @ 15:00)      RADIOLOGY & ADDITIONAL TESTS:  I have personally reviewed the last Chest xray  CXR      CT      CARDIOLOGY TESTING  12 Lead ECG:   Ventricular Rate 104 BPM    Atrial Rate 104 BPM    P-R Interval 144 ms    QRS Duration 90 ms    Q-T Interval 344 ms    QTC Calculation(Bazett) 452 ms    P Axis 60 degrees    R Axis 116 degrees    T Axis 24 degrees    Diagnosis Line Sinus tachycardia  Right axis deviation  Cannot rule out Anterior infarct , age undetermined  Abnormal ECG    Confirmed by ZAHRAA NUNES MD ([id number]) on 4/12/2021 4:16:24 PM (04-12-21 @ 15:16)      MEDICATIONS  chlorhexidine 4% Liquid 1 Topical <User Schedule>  citalopram 10 Oral daily  dexAMETHasone  Injectable 6 IV Push daily  dextrose 40% Gel 15 Oral once  dextrose 5%. 1000 IV Continuous <Continuous>  dextrose 5%. 1000 IV Continuous <Continuous>  dextrose 50% Injectable 25 IV Push once  dextrose 50% Injectable 12.5 IV Push once  dextrose 50% Injectable 25 IV Push once  enoxaparin Injectable 40 SubCutaneous every 12 hours  glucagon  Injectable 1 IntraMuscular once  insulin glargine Injectable (LANTUS) 25 SubCutaneous at bedtime  insulin lispro (ADMELOG) corrective regimen sliding scale  SubCutaneous three times a day before meals  insulin lispro Injectable (ADMELOG) 8 SubCutaneous three times a day before meals  losartan 50 Oral daily  pantoprazole    Tablet 40 Oral before breakfast  remdesivir  IVPB  IV Intermittent   remdesivir  IVPB 100 IV Intermittent every 24 hours      Weight  Weight (kg): 108.9 (04-12-21 @ 21:44)    ANTIBIOTICS:  remdesivir  IVPB   IV Intermittent   remdesivir  IVPB 100 milliGRAM(s) IV Intermittent every 24 hours      ALLERGIES:  No Known Allergies

## 2021-04-13 NOTE — PROGRESS NOTE ADULT - SUBJECTIVE AND OBJECTIVE BOX
LIZ PAULSON  45y  [facility] T[facility] 012 B      Patient is a 45y old  Female who presents with a chief complaint of shortness of breath (13 Apr 2021 08:55)      INTERVAL HPI/OVERNIGHT EVENTS:    no acute events overnight     REVIEW OF SYSTEMS:  CONSTITUTIONAL: + fatigue  EYES: No eye pain, visual disturbances, or discharge  ENMT:  No difficulty hearing, tinnitus, vertigo; No sinus or throat pain  NECK: No pain or stiffness  BREASTS: No pain, masses, or nipple discharge  RESPIRATORY: No cough, wheezing, chills or hemoptysis; No shortness of breath  CARDIOVASCULAR: No chest pain, palpitations, dizziness, or leg swelling  GASTROINTESTINAL: No abdominal or epigastric pain. No nausea, vomiting, or hematemesis; No diarrhea or constipation. No melena or hematochezia.  GENITOURINARY: No dysuria, frequency, hematuria, or incontinence  NEUROLOGICAL: No headaches, memory loss, loss of strength, numbness, or tremors  SKIN: No itching, burning, rashes, or lesions   LYMPH NODES: No enlarged glands  ENDOCRINE: No heat or cold intolerance; No hair loss  MUSCULOSKELETAL: No joint pain or swelling; No muscle, back, or extremity pain  PSYCHIATRIC: No depression, anxiety, mood swings, or difficulty sleeping  HEME/LYMPH: No easy bruising, or bleeding gums  ALLERY AND IMMUNOLOGIC: No hives or eczema  FAMILY HISTORY:    T(C): 37.3 (04-13-21 @ 07:11), Max: 38.6 (04-12-21 @ 17:54)  HR: 102 (04-13-21 @ 05:00) (83 - 112)  BP: 123/63 (04-13-21 @ 05:00) (123/63 - 140/87)  RR: 18 (04-13-21 @ 05:00) (18 - 22)  SpO2: 93% (04-13-21 @ 07:33) (89% - 99%)  Wt(kg): --Vital Signs Last 24 Hrs  T(C): 37.3 (13 Apr 2021 07:11), Max: 38.6 (12 Apr 2021 17:54)  T(F): 99.2 (13 Apr 2021 07:11), Max: 101.5 (13 Apr 2021 02:18)  HR: 102 (13 Apr 2021 05:00) (83 - 112)  BP: 123/63 (13 Apr 2021 05:00) (123/63 - 140/87)  BP(mean): --  RR: 18 (13 Apr 2021 05:00) (18 - 22)  SpO2: 93% (13 Apr 2021 07:33) (89% - 99%)    PHYSICAL EXAM:  GENERAL: NAD, well-groomed, well-developed  HEAD:  Atraumatic, Normocephalic  EYES: EOMI, PERRLA, conjunctiva and sclera clear  ENMT: No tonsillar erythema, exudates, or enlargement; Moist mucous membranes, Good dentition, No lesions  NECK: Supple, No JVD, Normal thyroid  NERVOUS SYSTEM:  Alert & Oriented X3, Good concentration; Motor Strength 5/5 B/L upper and lower extremities; DTRs 2+ intact and symmetric  PULM: Clear to auscultation bilaterally  CARDIAC: Regular rate and rhythm; No murmurs, rubs, or gallops  GI: Soft, Nontender, Nondistended; Bowel sounds present  EXTREMITIES:  2+ Peripheral Pulses, No clubbing, cyanosis, or edema  LYMPH: No lymphadenopathy noted  SKIN: No rashes or lesions    Consultant(s) Notes Reviewed:  [x ] YES  [ ] NO  Care Discussed with Consultants/Other Providers [ x] YES  [ ] NO    LABS:                            12.5   4.43  )-----------( 136      ( 13 Apr 2021 06:50 )             39.4   04-13    136  |  102  |  10  ----------------------------<  200<H>  3.7   |  21  |  0.7    Ca    8.0<L>      13 Apr 2021 06:50  Mg     1.8     04-13    TPro  6.5  /  Alb  3.5  /  TBili  0.2  /  DBili  x   /  AST  36  /  ALT  24  /  AlkPhos  48  04-13            acetaminophen   Tablet .. 650 milliGRAM(s) Oral every 4 hours PRN  chlorhexidine 4% Liquid 1 Application(s) Topical <User Schedule>  citalopram 10 milliGRAM(s) Oral daily  dexAMETHasone  Injectable 6 milliGRAM(s) IV Push daily  dextrose 40% Gel 15 Gram(s) Oral once  dextrose 5%. 1000 milliLiter(s) IV Continuous <Continuous>  dextrose 5%. 1000 milliLiter(s) IV Continuous <Continuous>  dextrose 50% Injectable 25 Gram(s) IV Push once  dextrose 50% Injectable 12.5 Gram(s) IV Push once  dextrose 50% Injectable 25 Gram(s) IV Push once  enoxaparin Injectable 40 milliGRAM(s) SubCutaneous every 12 hours  glucagon  Injectable 1 milliGRAM(s) IntraMuscular once  guaifenesin/dextromethorphan  Syrup 10 milliLiter(s) Oral every 4 hours PRN  insulin glargine Injectable (LANTUS) 25 Unit(s) SubCutaneous at bedtime  insulin lispro (ADMELOG) corrective regimen sliding scale   SubCutaneous three times a day before meals  insulin lispro Injectable (ADMELOG) 8 Unit(s) SubCutaneous three times a day before meals  losartan 50 milliGRAM(s) Oral daily  pantoprazole    Tablet 40 milliGRAM(s) Oral before breakfast  remdesivir  IVPB   IV Intermittent   remdesivir  IVPB 100 milliGRAM(s) IV Intermittent every 24 hours      HEALTH ISSUES - PROBLEM Dx:          Case Discussed with House Staff      Spectra [phone]

## 2021-04-13 NOTE — CONSULT NOTE ADULT - ASSESSMENT
ASSESSMENT  46 yo F with  HTN, COVID19 positive on 4/6/21 presents to ED with worsening shortness of breath. Per patient, she had exposure to COVID on 4/3, ended up testing positive on 4/6. On 4/6, she began having cough, congestion, myalgias, fatigue, and subjective fevers. Went to PMD, started on z-pack (completed course)       IMPRESSION  #COVID19 PNA, Severe (O2 < or = 94% on RA and requiring supplemental O2)    CXR diffuse bilateral opacities     Admission WBC 4    Exposed 4/3, + 4/6    C-Reactive Protein, Serum: 52 mg/L (04-12-21 @ 15:00)    D-Dimer Assay, Quantitative: 123 ng/mL DDU (04-12-21 @ 15:00)  #Sepsis on admission  T>101F, Pulse>90  #Morbid Obesity BMI (kg/m2): 40    Creatinine, Serum: 0.7 (04-12-21 @ 15:00)      RECOMMENDATIONS  - Remdesivir D1: 200mg x1, Day 2 - Day 5 100mg IV daily. Monitor Cr/LFTs  - Check COVID Ab and if NEGATIVE, can offer and consent for convalescent Plasma: 1 unit over 2h  (Please  patient- will not be able to receive vaccine x 90 days)  - Dexamethasone 6mg x 10 days or until Discharge (whichever is shorter), any taper per Pulm  - A/C per primary team  - Prone positioning if possible  - Monitor off Abx , viral PNA  - Transfer [place] when possible    If any questions, please call or send a message on [employer] Teams  Spectra [phone]

## 2021-04-13 NOTE — PROGRESS NOTE ADULT - SUBJECTIVE AND OBJECTIVE BOX
Hospital Day:  1d    Subjective: Patient is a 45y old  Female who presents with a chief complaint of shortness of breath (13 Apr 2021 07:33)      Pt seen and evaluated at bedside.   Complaints: only reports fevers, denies any acute shortness of breath; says she walks normally and has no needs   Over the night Events: fevers     Past Medical Hx:   Hypertension    Diabetes mellitus      Past Sx:    Allergies:  No Known Allergies    Current Meds:   Standng Meds:  chlorhexidine 4% Liquid 1 Application(s) Topical <User Schedule>  citalopram 10 milliGRAM(s) Oral daily  dexAMETHasone  Injectable 6 milliGRAM(s) IV Push daily  dextrose 40% Gel 15 Gram(s) Oral once  dextrose 5%. 1000 milliLiter(s) (50 mL/Hr) IV Continuous <Continuous>  dextrose 5%. 1000 milliLiter(s) (100 mL/Hr) IV Continuous <Continuous>  dextrose 50% Injectable 25 Gram(s) IV Push once  dextrose 50% Injectable 12.5 Gram(s) IV Push once  dextrose 50% Injectable 25 Gram(s) IV Push once  enoxaparin Injectable 40 milliGRAM(s) SubCutaneous every 12 hours  glucagon  Injectable 1 milliGRAM(s) IntraMuscular once  insulin glargine Injectable (LANTUS) 25 Unit(s) SubCutaneous at bedtime  insulin lispro (ADMELOG) corrective regimen sliding scale   SubCutaneous three times a day before meals  insulin lispro Injectable (ADMELOG) 8 Unit(s) SubCutaneous three times a day before meals  losartan 50 milliGRAM(s) Oral daily  pantoprazole    Tablet 40 milliGRAM(s) Oral before breakfast  remdesivir  IVPB   IV Intermittent   remdesivir  IVPB 100 milliGRAM(s) IV Intermittent every 24 hours    PRN Meds:  acetaminophen   Tablet .. 650 milliGRAM(s) Oral every 4 hours PRN Temp greater or equal to 38.5C (101.3F)  guaifenesin/dextromethorphan  Syrup 10 milliLiter(s) Oral every 4 hours PRN Cough      Vital Signs:   T(F): 99.2 (04-13-21 @ 07:11), Max: 101.5 (04-13-21 @ 02:18)  HR: 102 (04-13-21 @ 05:00) (83 - 112)  BP: 123/63 (04-13-21 @ 05:00) (123/63 - 140/87)  RR: 18 (04-13-21 @ 05:00) (18 - 22)  SpO2: 93% (04-13-21 @ 07:33) (89% - 99%)    Physical Exam:   GENERAL: NAD, Resting in bed  HEENT: NCAT  CHEST/LUNG: Clear to auscultation bilaterally; No wheezing or rubs.   HEART: Regular rate and rhythm; No murmurs, rubs, or gallops  ABDOMEN: Bowel sounds present; Soft, Nontender, Nondistended.   EXTREMITIES:  No clubbing, cyanosis, or edema  NERVOUS SYSTEM:  Alert & Oriented X3    FLUID BALANCE      Labs:                         12.5   4.43  )-----------( 136      ( 13 Apr 2021 06:50 )             39.4     Neutophil% 76.6, Lymphocyte% 19.6, Monocyte% 3.4, Bands% 0.2 04-12-21 @ 15:00    13 Apr 2021 06:50    136    |  102    |  10     ----------------------------<  200    3.7     |  21     |  0.7      Ca    8.0        13 Apr 2021 06:50  Mg     1.8       13 Apr 2021 06:50    TPro  6.5    /  Alb  3.5    /  TBili  0.2    /  DBili  x      /  AST  36     /  ALT  24     /  AlkPhos  48     13 Apr 2021 06:50       pTT    25.7             ----< 1.07 INR  (04-12-21 @ 15:00)    12.30        PT        Serum Pro-Brain Natriuretic Peptide: 15 pg/mL (04-12-21 @ 15:00)    Troponin <0.01, CKMB --, CK 66 04-12-21 @ 21:55  Troponin <0.01, CKMB --, CK 83 04-12-21 @ 15:00    Iron --, TIBC --, %Sat -- Ferritin 566 04-12-21 @ 15:00            D-Dimer Assay, Quantitative: 123 ng/mL DDU (04-12-21 @ 15:00)      Ferritin, Serum: 566 ng/mL (04-12-21 @ 15:00)    C-Reactive Protein, Serum: 52 mg/L (04-12-21 @ 15:00)    Lactate Dehydrogenase, Serum: 286 (04-12-21 @ 21:55)      Radiology:     CXR Hospital Day:  1d    Subjective: Patient is a 45y old  Female who presents with a chief complaint of shortness of breath (13 Apr 2021 07:33)      Pt seen and evaluated at bedside.   Complaints: only reports fevers, denies any acute shortness of breath; says she walks normally and has no needs   Over the night Events: fevers     Past Medical Hx:   Hypertension    Diabetes mellitus      Past Sx:    Allergies:  No Known Allergies    Current Meds:   Standng Meds:  chlorhexidine 4% Liquid 1 Application(s) Topical <User Schedule>  citalopram 10 milliGRAM(s) Oral daily  dexAMETHasone  Injectable 6 milliGRAM(s) IV Push daily  dextrose 40% Gel 15 Gram(s) Oral once  dextrose 5%. 1000 milliLiter(s) (50 mL/Hr) IV Continuous <Continuous>  dextrose 5%. 1000 milliLiter(s) (100 mL/Hr) IV Continuous <Continuous>  dextrose 50% Injectable 25 Gram(s) IV Push once  dextrose 50% Injectable 12.5 Gram(s) IV Push once  dextrose 50% Injectable 25 Gram(s) IV Push once  enoxaparin Injectable 40 milliGRAM(s) SubCutaneous every 12 hours  glucagon  Injectable 1 milliGRAM(s) IntraMuscular once  insulin glargine Injectable (LANTUS) 25 Unit(s) SubCutaneous at bedtime  insulin lispro (ADMELOG) corrective regimen sliding scale   SubCutaneous three times a day before meals  insulin lispro Injectable (ADMELOG) 8 Unit(s) SubCutaneous three times a day before meals  losartan 50 milliGRAM(s) Oral daily  pantoprazole    Tablet 40 milliGRAM(s) Oral before breakfast  remdesivir  IVPB   IV Intermittent   remdesivir  IVPB 100 milliGRAM(s) IV Intermittent every 24 hours    PRN Meds:  acetaminophen   Tablet .. 650 milliGRAM(s) Oral every 4 hours PRN Temp greater or equal to 38.5C (101.3F)  guaifenesin/dextromethorphan  Syrup 10 milliLiter(s) Oral every 4 hours PRN Cough      Vital Signs:   T(F): 99.2 (04-13-21 @ 07:11), Max: 101.5 (04-13-21 @ 02:18)  HR: 102 (04-13-21 @ 05:00) (83 - 112)  BP: 123/63 (04-13-21 @ 05:00) (123/63 - 140/87)  RR: 18 (04-13-21 @ 05:00) (18 - 22)  SpO2: 93% (04-13-21 @ 07:33) (89% - 99%)    Physical Exam:   GENERAL: NAD, Resting in bed  HEENT: NCAT  CHEST/LUNG: Clear to auscultation bilaterally; No wheezing or rubs.   HEART: Regular rate and rhythm; No murmurs, rubs, or gallops  ABDOMEN: Bowel sounds present; Soft, Nontender, Nondistended.   EXTREMITIES:  No clubbing, cyanosis, or edema  NERVOUS SYSTEM:  Alert & Oriented X3    FLUID BALANCE      Labs:                         12.5   4.43  )-----------( 136      ( 13 Apr 2021 06:50 )             39.4     Neutophil% 76.6, Lymphocyte% 19.6, Monocyte% 3.4, Bands% 0.2 04-12-21 @ 15:00    13 Apr 2021 06:50    136    |  102    |  10     ----------------------------<  200    3.7     |  21     |  0.7      Ca    8.0        13 Apr 2021 06:50  Mg     1.8       13 Apr 2021 06:50    TPro  6.5    /  Alb  3.5    /  TBili  0.2    /  DBili  x      /  AST  36     /  ALT  24     /  AlkPhos  48     13 Apr 2021 06:50       pTT    25.7             ----< 1.07 INR  (04-12-21 @ 15:00)    12.30        PT        Serum Pro-Brain Natriuretic Peptide: 15 pg/mL (04-12-21 @ 15:00)    Troponin <0.01, CKMB --, CK 66 04-12-21 @ 21:55  Troponin <0.01, CKMB --, CK 83 04-12-21 @ 15:00    Iron --, TIBC --, %Sat -- Ferritin 566 04-12-21 @ 15:00            D-Dimer Assay, Quantitative: 123 ng/mL DDU (04-12-21 @ 15:00)      Ferritin, Serum: 566 ng/mL (04-12-21 @ 15:00)    C-Reactive Protein, Serum: 52 mg/L (04-12-21 @ 15:00)    Lactate Dehydrogenase, Serum: 286 (04-12-21 @ 21:55)      Radiology:     CXR: b/l opacities; interval increase on left (wet read)

## 2021-04-14 LAB
ALBUMIN SERPL ELPH-MCNC: 3.7 G/DL — SIGNIFICANT CHANGE UP (ref 3.5–5.2)
ALP SERPL-CCNC: 54 U/L — SIGNIFICANT CHANGE UP (ref 30–115)
ALT FLD-CCNC: 28 U/L — SIGNIFICANT CHANGE UP (ref 0–41)
ANION GAP SERPL CALC-SCNC: 10 MMOL/L — SIGNIFICANT CHANGE UP (ref 7–14)
AST SERPL-CCNC: 45 U/L — HIGH (ref 0–41)
BASOPHILS # BLD AUTO: 0 K/UL — SIGNIFICANT CHANGE UP (ref 0–0.2)
BASOPHILS NFR BLD AUTO: 0 % — SIGNIFICANT CHANGE UP (ref 0–1)
BILIRUB SERPL-MCNC: 0.3 MG/DL — SIGNIFICANT CHANGE UP (ref 0.2–1.2)
BUN SERPL-MCNC: 17 MG/DL — SIGNIFICANT CHANGE UP (ref 10–20)
CALCIUM SERPL-MCNC: 8.5 MG/DL — SIGNIFICANT CHANGE UP (ref 8.5–10.1)
CHLORIDE SERPL-SCNC: 102 MMOL/L — SIGNIFICANT CHANGE UP (ref 98–110)
CO2 SERPL-SCNC: 25 MMOL/L — SIGNIFICANT CHANGE UP (ref 17–32)
CREAT SERPL-MCNC: 0.6 MG/DL — LOW (ref 0.7–1.5)
D DIMER BLD IA.RAPID-MCNC: 151 NG/ML DDU — SIGNIFICANT CHANGE UP (ref 0–230)
EOSINOPHIL # BLD AUTO: 0 K/UL — SIGNIFICANT CHANGE UP (ref 0–0.7)
EOSINOPHIL NFR BLD AUTO: 0 % — SIGNIFICANT CHANGE UP (ref 0–8)
GLUCOSE BLDC GLUCOMTR-MCNC: 149 MG/DL — HIGH (ref 70–99)
GLUCOSE BLDC GLUCOMTR-MCNC: 170 MG/DL — HIGH (ref 70–99)
GLUCOSE BLDC GLUCOMTR-MCNC: 175 MG/DL — HIGH (ref 70–99)
GLUCOSE BLDC GLUCOMTR-MCNC: 193 MG/DL — HIGH (ref 70–99)
GLUCOSE BLDC GLUCOMTR-MCNC: 220 MG/DL — HIGH (ref 70–99)
GLUCOSE SERPL-MCNC: 203 MG/DL — HIGH (ref 70–99)
HCT VFR BLD CALC: 41.9 % — SIGNIFICANT CHANGE UP (ref 37–47)
HGB BLD-MCNC: 13.3 G/DL — SIGNIFICANT CHANGE UP (ref 12–16)
IMM GRANULOCYTES NFR BLD AUTO: 0.7 % — HIGH (ref 0.1–0.3)
LYMPHOCYTES # BLD AUTO: 1.53 K/UL — SIGNIFICANT CHANGE UP (ref 1.2–3.4)
LYMPHOCYTES # BLD AUTO: 20 % — LOW (ref 20.5–51.1)
MAGNESIUM SERPL-MCNC: 2 MG/DL — SIGNIFICANT CHANGE UP (ref 1.8–2.4)
MCHC RBC-ENTMCNC: 28.4 PG — SIGNIFICANT CHANGE UP (ref 27–31)
MCHC RBC-ENTMCNC: 31.7 G/DL — LOW (ref 32–37)
MCV RBC AUTO: 89.5 FL — SIGNIFICANT CHANGE UP (ref 81–99)
MONOCYTES # BLD AUTO: 0.33 K/UL — SIGNIFICANT CHANGE UP (ref 0.1–0.6)
MONOCYTES NFR BLD AUTO: 4.3 % — SIGNIFICANT CHANGE UP (ref 1.7–9.3)
NEUTROPHILS # BLD AUTO: 5.74 K/UL — SIGNIFICANT CHANGE UP (ref 1.4–6.5)
NEUTROPHILS NFR BLD AUTO: 75 % — SIGNIFICANT CHANGE UP (ref 42.2–75.2)
NRBC # BLD: 0 /100 WBCS — SIGNIFICANT CHANGE UP (ref 0–0)
NT-PROBNP SERPL-SCNC: 48 PG/ML — SIGNIFICANT CHANGE UP (ref 0–300)
PLATELET # BLD AUTO: 185 K/UL — SIGNIFICANT CHANGE UP (ref 130–400)
POTASSIUM SERPL-MCNC: 3.9 MMOL/L — SIGNIFICANT CHANGE UP (ref 3.5–5)
POTASSIUM SERPL-SCNC: 3.9 MMOL/L — SIGNIFICANT CHANGE UP (ref 3.5–5)
PROT SERPL-MCNC: 6.7 G/DL — SIGNIFICANT CHANGE UP (ref 6–8)
RBC # BLD: 4.68 M/UL — SIGNIFICANT CHANGE UP (ref 4.2–5.4)
RBC # FLD: 13.5 % — SIGNIFICANT CHANGE UP (ref 11.5–14.5)
SODIUM SERPL-SCNC: 137 MMOL/L — SIGNIFICANT CHANGE UP (ref 135–146)
TROPONIN T SERPL-MCNC: <0.01 NG/ML — SIGNIFICANT CHANGE UP
WBC # BLD: 7.65 K/UL — SIGNIFICANT CHANGE UP (ref 4.8–10.8)
WBC # FLD AUTO: 7.65 K/UL — SIGNIFICANT CHANGE UP (ref 4.8–10.8)

## 2021-04-14 PROCEDURE — 71045 X-RAY EXAM CHEST 1 VIEW: CPT | Mod: 26

## 2021-04-14 PROCEDURE — 93010 ELECTROCARDIOGRAM REPORT: CPT

## 2021-04-14 PROCEDURE — 93970 EXTREMITY STUDY: CPT | Mod: 26

## 2021-04-14 PROCEDURE — 99233 SBSQ HOSP IP/OBS HIGH 50: CPT

## 2021-04-14 RX ORDER — FUROSEMIDE 40 MG
20 TABLET ORAL ONCE
Refills: 0 | Status: COMPLETED | OUTPATIENT
Start: 2021-04-14 | End: 2021-04-14

## 2021-04-14 RX ORDER — IBUPROFEN 200 MG
400 TABLET ORAL ONCE
Refills: 0 | Status: COMPLETED | OUTPATIENT
Start: 2021-04-14 | End: 2021-04-14

## 2021-04-14 RX ADMIN — Medication 20 MILLIGRAM(S): at 17:16

## 2021-04-14 RX ADMIN — ENOXAPARIN SODIUM 40 MILLIGRAM(S): 100 INJECTION SUBCUTANEOUS at 05:30

## 2021-04-14 RX ADMIN — Medication 1: at 08:05

## 2021-04-14 RX ADMIN — Medication 6 MILLIGRAM(S): at 05:31

## 2021-04-14 RX ADMIN — REMDESIVIR 500 MILLIGRAM(S): 5 INJECTION INTRAVENOUS at 23:59

## 2021-04-14 RX ADMIN — Medication 8 UNIT(S): at 12:38

## 2021-04-14 RX ADMIN — Medication 2: at 12:38

## 2021-04-14 RX ADMIN — Medication 10 MILLILITER(S): at 05:30

## 2021-04-14 RX ADMIN — PANTOPRAZOLE SODIUM 40 MILLIGRAM(S): 20 TABLET, DELAYED RELEASE ORAL at 05:30

## 2021-04-14 RX ADMIN — Medication 8 UNIT(S): at 08:04

## 2021-04-14 RX ADMIN — CITALOPRAM 10 MILLIGRAM(S): 10 TABLET, FILM COATED ORAL at 12:39

## 2021-04-14 RX ADMIN — Medication 8 UNIT(S): at 17:13

## 2021-04-14 RX ADMIN — ENOXAPARIN SODIUM 40 MILLIGRAM(S): 100 INJECTION SUBCUTANEOUS at 17:15

## 2021-04-14 RX ADMIN — INSULIN GLARGINE 25 UNIT(S): 100 INJECTION, SOLUTION SUBCUTANEOUS at 23:06

## 2021-04-14 RX ADMIN — Medication 1: at 17:13

## 2021-04-14 RX ADMIN — LOSARTAN POTASSIUM 50 MILLIGRAM(S): 100 TABLET, FILM COATED ORAL at 05:30

## 2021-04-14 RX ADMIN — Medication 400 MILLIGRAM(S): at 06:22

## 2021-04-14 RX ADMIN — Medication 400 MILLIGRAM(S): at 05:52

## 2021-04-14 NOTE — PROGRESS NOTE ADULT - SUBJECTIVE AND OBJECTIVE BOX
LIZ PAULSON  45y  Female      Patient is a 45y old  Female who presents with a chief complaint of shortness of breath.      INTERVAL HPI/OVERNIGHT EVENTS: The patient was seen and examined at bedside.  Resting on NRB. NO distress.       ******************************* REVIEW OF SYSTEMS:**********************************************    All other review of systems negative    *********************** VITALS ******************************************    T(F): 97.9 (04-14-21 @ 12:30)  HR: 82 (04-14-21 @ 12:30) (82 - 92)  BP: 116/62 (04-14-21 @ 12:30) (116/62 - 129/74)  RR: 19 (04-14-21 @ 12:30) (18 - 19)  SpO2: 95% (04-14-21 @ 12:30) (95% - 98%)            ******************************** PHYSICAL EXAM:**************************************************  GENERAL: NAD    PSYCH: no agitation, baseline mentation  HEENT:     NERVOUS SYSTEM:  Alert & Oriented X3, MS  5/5 B/L  UE and LE ; Sensory intact    PULMONARY: FARIBA, decreased     CARDIOVASCULAR: S1S2 RRR    GI: Soft, NT, ND; BS present.    EXTREMITIES:  2+ Peripheral Pulses, No clubbing, cyanosis, or edema    LYMPH: No lymphadenopathy noted    SKIN: No rashes or lesions      **************************** LABS *******************************************************                          13.3   7.65  )-----------( 185      ( 14 Apr 2021 08:03 )             41.9     04-14    137  |  102  |  17  ----------------------------<  203<H>  3.9   |  25  |  0.6<L>    Ca    8.5      14 Apr 2021 08:03  Mg     2.0     04-14    TPro  6.7  /  Alb  3.7  /  TBili  0.3  /  DBili  x   /  AST  45<H>  /  ALT  28  /  AlkPhos  54  04-14          Lactate Trend  04-12 @ 21:55 Lactate:1.2   04-12 @ 15:00 Lactate:1.3     CARDIAC MARKERS ( 14 Apr 2021 11:53 )  x     / <0.01 ng/mL / x     / x     / x      CARDIAC MARKERS ( 12 Apr 2021 21:55 )  x     / <0.01 ng/mL / 66 U/L / x     / x          CAPILLARY BLOOD GLUCOSE      POCT Blood Glucose.: 220 mg/dL (14 Apr 2021 11:20)          **************************Active Medications *******************************************  No Known Allergies      acetaminophen   Tablet .. 650 milliGRAM(s) Oral every 6 hours PRN  chlorhexidine 4% Liquid 1 Application(s) Topical <User Schedule>  citalopram 10 milliGRAM(s) Oral daily  dexAMETHasone  Injectable 6 milliGRAM(s) IV Push daily  dextrose 40% Gel 15 Gram(s) Oral once  dextrose 5%. 1000 milliLiter(s) IV Continuous <Continuous>  dextrose 5%. 1000 milliLiter(s) IV Continuous <Continuous>  dextrose 50% Injectable 25 Gram(s) IV Push once  dextrose 50% Injectable 12.5 Gram(s) IV Push once  dextrose 50% Injectable 25 Gram(s) IV Push once  enoxaparin Injectable 40 milliGRAM(s) SubCutaneous every 12 hours  furosemide   Injectable 20 milliGRAM(s) IV Push once  glucagon  Injectable 1 milliGRAM(s) IntraMuscular once  guaifenesin/dextromethorphan  Syrup 10 milliLiter(s) Oral every 4 hours PRN  insulin glargine Injectable (LANTUS) 25 Unit(s) SubCutaneous at bedtime  insulin lispro (ADMELOG) corrective regimen sliding scale   SubCutaneous three times a day before meals  insulin lispro Injectable (ADMELOG) 8 Unit(s) SubCutaneous three times a day before meals  losartan 50 milliGRAM(s) Oral daily  pantoprazole    Tablet 40 milliGRAM(s) Oral before breakfast  remdesivir  IVPB   IV Intermittent   remdesivir  IVPB 100 milliGRAM(s) IV Intermittent every 24 hours      ***************************************************  RADIOLOGY & ADDITIONAL TESTS:    Imaging Personally Reviewed:  [ ] YES  [ ] NO    HEALTH ISSUES - PROBLEM Dx:

## 2021-04-14 NOTE — PROGRESS NOTE ADULT - SUBJECTIVE AND OBJECTIVE BOX
Hospital Day:  2d    Subjective: Patient is a 45y old  Female who presents with a chief complaint of shortness of breath (13 Apr 2021 12:33)      Pt seen and evaluated at bedside.     Overnight patient reported chest pain worse with exertion w/ walking, worse w/ inspiration, and chest was tender to palpation.   This morning patient reports same complaints although not tender to palpation. Reports pain is better and is sitting up at the edge of the bed comfortably saturating normally with non rebreather.     Past Medical Hx:   Hypertension    Diabetes mellitus      Past Sx:    Allergies:  No Known Allergies    Current Meds:   Standng Meds:  chlorhexidine 4% Liquid 1 Application(s) Topical <User Schedule>  citalopram 10 milliGRAM(s) Oral daily  dexAMETHasone  Injectable 6 milliGRAM(s) IV Push daily  dextrose 40% Gel 15 Gram(s) Oral once  dextrose 5%. 1000 milliLiter(s) (50 mL/Hr) IV Continuous <Continuous>  dextrose 5%. 1000 milliLiter(s) (100 mL/Hr) IV Continuous <Continuous>  dextrose 50% Injectable 25 Gram(s) IV Push once  dextrose 50% Injectable 12.5 Gram(s) IV Push once  dextrose 50% Injectable 25 Gram(s) IV Push once  enoxaparin Injectable 40 milliGRAM(s) SubCutaneous every 12 hours  glucagon  Injectable 1 milliGRAM(s) IntraMuscular once  insulin glargine Injectable (LANTUS) 25 Unit(s) SubCutaneous at bedtime  insulin lispro (ADMELOG) corrective regimen sliding scale   SubCutaneous three times a day before meals  insulin lispro Injectable (ADMELOG) 8 Unit(s) SubCutaneous three times a day before meals  losartan 50 milliGRAM(s) Oral daily  pantoprazole    Tablet 40 milliGRAM(s) Oral before breakfast  remdesivir  IVPB   IV Intermittent   remdesivir  IVPB 100 milliGRAM(s) IV Intermittent every 24 hours    PRN Meds:  acetaminophen   Tablet .. 650 milliGRAM(s) Oral every 6 hours PRN Temp greater or equal to 38C (100.4F), Moderate Pain (4 - 6)  guaifenesin/dextromethorphan  Syrup 10 milliLiter(s) Oral every 4 hours PRN Cough      Vital Signs:   T(F): 98.5 (04-14-21 @ 05:00), Max: 98.5 (04-14-21 @ 05:00)  HR: 83 (04-14-21 @ 05:00) (83 - 93)  BP: 127/75 (04-14-21 @ 05:00) (127/75 - 139/85)  RR: 18 (04-14-21 @ 05:00) (18 - 18)  SpO2: 95% (04-14-21 @ 05:00) (95% - 98%)    Physical Exam:   GENERAL: NAD, Resting in bed  HEENT: NCAT  CHEST/LUNG: Clear to auscultation bilaterally; No wheezing or rubs.   HEART: Regular rate and rhythm; No murmurs, rubs, or gallops  ABDOMEN: Bowel sounds present; Soft, Nontender, Nondistended.   EXTREMITIES:  No clubbing, cyanosis, or edema  NERVOUS SYSTEM:  Alert & Oriented X3    FLUID BALANCE      Labs:                         12.5   4.43  )-----------( 136      ( 13 Apr 2021 06:50 )             39.4       13 Apr 2021 06:50    136    |  102    |  10     ----------------------------<  200    3.7     |  21     |  0.7      Ca    8.0        13 Apr 2021 06:50  Mg     1.8       13 Apr 2021 06:50    TPro  6.5    /  Alb  3.5    /  TBili  0.2    /  DBili  x      /  AST  36     /  ALT  24     /  AlkPhos  48     13 Apr 2021 06:50            Serum Pro-Brain Natriuretic Peptide: 15 pg/mL (04-12-21 @ 15:00)    Troponin <0.01, CKMB --, CK 66 04-12-21 @ 21:55  Troponin <0.01, CKMB --, CK 83 04-12-21 @ 15:00    Iron --, TIBC --, %Sat -- Ferritin 566 04-12-21 @ 15:00            D-Dimer Assay, Quantitative: 123 ng/mL DDU (04-12-21 @ 15:00)    Procalcitonin, Serum: 0.11 ng/mL (04-12-21 @ 15:00)    Ferritin, Serum: 566 ng/mL (04-12-21 @ 15:00)    C-Reactive Protein, Serum: 52 mg/L (04-12-21 @ 15:00)    Lactate Dehydrogenase, Serum: 286 (04-12-21 @ 21:55)

## 2021-04-14 NOTE — PROGRESS NOTE ADULT - SUBJECTIVE AND OBJECTIVE BOX
PAULSONLIZ NGUYEN  45y, Female  Allergy: No Known Allergies      LOS  2d    CHIEF COMPLAINT: shortness of breath (14 Apr 2021 09:02)      INTERVAL EVENTS/HPI  - NRM  - T(F): , Max: 98.5 (04-14-21 @ 05:00)  - WBC Count: 7.65 (04-14-21 @ 08:03)  WBC Count: 4.43 (04-13-21 @ 06:50)     - Creatinine, Serum: 0.6 (04-14-21 @ 08:03)  Creatinine, Serum: 0.7 (04-13-21 @ 06:50)       COVID-19 [place] [facility] AB Interp: Negative (04-13-21 @ 06:50)      ROS:  9-point ROS performed and negative except as per above    VITALS:  T(F): 98.5, Max: 98.5 (04-14-21 @ 05:00)  HR: 83  BP: 127/75  RR: 18Vital Signs Last 24 Hrs  T(C): 36.9 (14 Apr 2021 05:00), Max: 36.9 (13 Apr 2021 12:35)  T(F): 98.5 (14 Apr 2021 05:00), Max: 98.5 (14 Apr 2021 05:00)  HR: 83 (14 Apr 2021 05:00) (83 - 93)  BP: 127/75 (14 Apr 2021 05:00) (127/75 - 139/85)  BP(mean): --  RR: 18 (14 Apr 2021 05:00) (18 - 18)  SpO2: 95% (14 Apr 2021 05:00) (95% - 98%)    FH: Non-contributory  Social Hx: Non-contributory    TESTS & MEASUREMENTS:                        13.3   7.65  )-----------( 185      ( 14 Apr 2021 08:03 )             41.9     04-14    137  |  102  |  17  ----------------------------<  203<H>  3.9   |  25  |  0.6<L>    Ca    8.5      14 Apr 2021 08:03  Mg     2.0     04-14    TPro  6.7  /  Alb  3.7  /  TBili  0.3  /  DBili  x   /  AST  45<H>  /  ALT  28  /  AlkPhos  54  04-14    eGFR if Non African American: 111 mL/min/1.73M2 (04-14-21 @ 08:03)  eGFR if African American: 128 mL/min/1.73M2 (04-14-21 @ 08:03)    LIVER FUNCTIONS - ( 14 Apr 2021 08:03 )  Alb: 3.7 g/dL / Pro: 6.7 g/dL / ALK PHOS: 54 U/L / ALT: 28 U/L / AST: 45 U/L / GGT: x                 Lactate, Blood: 1.2 mmol/L (04-12-21 @ 21:55)  Lactate, Blood: 1.3 mmol/L (04-12-21 @ 15:00)      INFECTIOUS DISEASES TESTING  Procalcitonin, Serum: 0.11 (04-12-21 @ 15:00)  COVID-19 PCR: Detected (04-12-21 @ 14:33)      INFLAMMATORY MARKERS  C-Reactive Protein, Serum: 52 mg/L (04-12-21 @ 15:00)      RADIOLOGY & ADDITIONAL TESTS:  I have personally reviewed the last available Chest xray  CXR  Xray Chest 1 View- PORTABLE-Urgent:   EXAM:  XR CHEST PORTABLE URGENT 1V            PROCEDURE DATE:  04/12/2021            INTERPRETATION:  Clinical History / Reason for exam: Shortness of breath    Comparison : Chest radiograph None.    Technique/Positioning: Frontal/low lung volumes.    Findings:    Support devices: None.    Cardiac/mediastinum/hilum: Unremarkable.    Lung parenchyma/Pleura: Bilateral opacities.    Skeleton/soft tissues: Unremarkable. Elevation of the right hemidiaphragm.    Impression:    Bilateral opacities.                  FLORA OROSCO MD; Attending Radiologist  This document has been electronically signed. Apr 13 2021 11:41AM (04-12-21 @ 16:30)      CT      CARDIOLOGY TESTING  12 Lead ECG:   Ventricular Rate 81 BPM    Atrial Rate 81 BPM    P-R Interval 152 ms    QRS Duration 90 ms    Q-T Interval 376 ms    QTC Calculation(Bazett) 436 ms    P Axis 58 degrees    R Axis 8 degrees    T Axis 21 degrees    Diagnosis Line Normal sinus rhythm  Nonspecific T wave abnormality  Abnormal ECG    Confirmed by IMANI CAMPOS MD ([id number]) on 4/14/2021 9:27:11 AM (04-14-21 @ 07:59)  12 Lead ECG:   Ventricular Rate 104 BPM    Atrial Rate 104 BPM    P-R Interval 144 ms    QRS Duration 90 ms    Q-T Interval 344 ms    QTC Calculation(Bazett) 452 ms    P Axis 60 degrees    R Axis 116 degrees    T Axis 24 degrees    Diagnosis Line Sinus tachycardia  Right axis deviation  Cannot rule out Anterior infarct , age undetermined  Abnormal ECG    Confirmed by ZAHRAA NUNES MD ([id number]) on 4/12/2021 4:16:24 PM (04-12-21 @ 15:16)      MEDICATIONS  chlorhexidine 4% Liquid 1 Topical <User Schedule>  citalopram 10 Oral daily  dexAMETHasone  Injectable 6 IV Push daily  dextrose 40% Gel 15 Oral once  dextrose 5%. 1000 IV Continuous <Continuous>  dextrose 5%. 1000 IV Continuous <Continuous>  dextrose 50% Injectable 25 IV Push once  dextrose 50% Injectable 12.5 IV Push once  dextrose 50% Injectable 25 IV Push once  enoxaparin Injectable 40 SubCutaneous every 12 hours  glucagon  Injectable 1 IntraMuscular once  insulin glargine Injectable (LANTUS) 25 SubCutaneous at bedtime  insulin lispro (ADMELOG) corrective regimen sliding scale  SubCutaneous three times a day before meals  insulin lispro Injectable (ADMELOG) 8 SubCutaneous three times a day before meals  losartan 50 Oral daily  pantoprazole    Tablet 40 Oral before breakfast  remdesivir  IVPB  IV Intermittent   remdesivir  IVPB 100 IV Intermittent every 24 hours      WEIGHT  Weight (kg): 108.9 (04-12-21 @ 21:44)  Creatinine, Serum: 0.6 mg/dL (04-14-21 @ 08:03)      ANTIBIOTICS:  remdesivir  IVPB   IV Intermittent   remdesivir  IVPB 100 milliGRAM(s) IV Intermittent every 24 hours      All available historical records have been reviewed

## 2021-04-15 LAB
ALBUMIN SERPL ELPH-MCNC: 4 G/DL — SIGNIFICANT CHANGE UP (ref 3.5–5.2)
ALP SERPL-CCNC: 58 U/L — SIGNIFICANT CHANGE UP (ref 30–115)
ALT FLD-CCNC: 82 U/L — HIGH (ref 0–41)
ANION GAP SERPL CALC-SCNC: 13 MMOL/L — SIGNIFICANT CHANGE UP (ref 7–14)
AST SERPL-CCNC: 104 U/L — HIGH (ref 0–41)
BASOPHILS # BLD AUTO: 0.01 K/UL — SIGNIFICANT CHANGE UP (ref 0–0.2)
BASOPHILS NFR BLD AUTO: 0.2 % — SIGNIFICANT CHANGE UP (ref 0–1)
BILIRUB SERPL-MCNC: 0.4 MG/DL — SIGNIFICANT CHANGE UP (ref 0.2–1.2)
BUN SERPL-MCNC: 22 MG/DL — HIGH (ref 10–20)
CALCIUM SERPL-MCNC: 8.9 MG/DL — SIGNIFICANT CHANGE UP (ref 8.5–10.1)
CHLORIDE SERPL-SCNC: 101 MMOL/L — SIGNIFICANT CHANGE UP (ref 98–110)
CO2 SERPL-SCNC: 28 MMOL/L — SIGNIFICANT CHANGE UP (ref 17–32)
CREAT SERPL-MCNC: 0.8 MG/DL — SIGNIFICANT CHANGE UP (ref 0.7–1.5)
CRP SERPL-MCNC: 42 MG/L — HIGH
EOSINOPHIL # BLD AUTO: 0 K/UL — SIGNIFICANT CHANGE UP (ref 0–0.7)
EOSINOPHIL NFR BLD AUTO: 0 % — SIGNIFICANT CHANGE UP (ref 0–8)
FERRITIN SERPL-MCNC: 867 NG/ML — HIGH (ref 15–150)
GAS PNL BLDA: SIGNIFICANT CHANGE UP
GLUCOSE BLDC GLUCOMTR-MCNC: 114 MG/DL — HIGH (ref 70–99)
GLUCOSE BLDC GLUCOMTR-MCNC: 180 MG/DL — HIGH (ref 70–99)
GLUCOSE BLDC GLUCOMTR-MCNC: 213 MG/DL — HIGH (ref 70–99)
GLUCOSE BLDC GLUCOMTR-MCNC: 229 MG/DL — HIGH (ref 70–99)
GLUCOSE SERPL-MCNC: 162 MG/DL — HIGH (ref 70–99)
HCT VFR BLD CALC: 42.4 % — SIGNIFICANT CHANGE UP (ref 37–47)
HGB BLD-MCNC: 13.6 G/DL — SIGNIFICANT CHANGE UP (ref 12–16)
IMM GRANULOCYTES NFR BLD AUTO: 0.3 % — SIGNIFICANT CHANGE UP (ref 0.1–0.3)
LYMPHOCYTES # BLD AUTO: 2.35 K/UL — SIGNIFICANT CHANGE UP (ref 1.2–3.4)
LYMPHOCYTES # BLD AUTO: 37.5 % — SIGNIFICANT CHANGE UP (ref 20.5–51.1)
MAGNESIUM SERPL-MCNC: 2.1 MG/DL — SIGNIFICANT CHANGE UP (ref 1.8–2.4)
MCHC RBC-ENTMCNC: 28.8 PG — SIGNIFICANT CHANGE UP (ref 27–31)
MCHC RBC-ENTMCNC: 32.1 G/DL — SIGNIFICANT CHANGE UP (ref 32–37)
MCV RBC AUTO: 89.6 FL — SIGNIFICANT CHANGE UP (ref 81–99)
MONOCYTES # BLD AUTO: 0.56 K/UL — SIGNIFICANT CHANGE UP (ref 0.1–0.6)
MONOCYTES NFR BLD AUTO: 8.9 % — SIGNIFICANT CHANGE UP (ref 1.7–9.3)
NEUTROPHILS # BLD AUTO: 3.32 K/UL — SIGNIFICANT CHANGE UP (ref 1.4–6.5)
NEUTROPHILS NFR BLD AUTO: 53.1 % — SIGNIFICANT CHANGE UP (ref 42.2–75.2)
NRBC # BLD: 0 /100 WBCS — SIGNIFICANT CHANGE UP (ref 0–0)
PLATELET # BLD AUTO: 234 K/UL — SIGNIFICANT CHANGE UP (ref 130–400)
POTASSIUM SERPL-MCNC: 3.9 MMOL/L — SIGNIFICANT CHANGE UP (ref 3.5–5)
POTASSIUM SERPL-SCNC: 3.9 MMOL/L — SIGNIFICANT CHANGE UP (ref 3.5–5)
PROCALCITONIN SERPL-MCNC: 0.06 NG/ML — SIGNIFICANT CHANGE UP (ref 0.02–0.1)
PROT SERPL-MCNC: 7.2 G/DL — SIGNIFICANT CHANGE UP (ref 6–8)
RBC # BLD: 4.73 M/UL — SIGNIFICANT CHANGE UP (ref 4.2–5.4)
RBC # FLD: 13.4 % — SIGNIFICANT CHANGE UP (ref 11.5–14.5)
SODIUM SERPL-SCNC: 142 MMOL/L — SIGNIFICANT CHANGE UP (ref 135–146)
WBC # BLD: 6.26 K/UL — SIGNIFICANT CHANGE UP (ref 4.8–10.8)
WBC # FLD AUTO: 6.26 K/UL — SIGNIFICANT CHANGE UP (ref 4.8–10.8)

## 2021-04-15 PROCEDURE — 99232 SBSQ HOSP IP/OBS MODERATE 35: CPT

## 2021-04-15 PROCEDURE — 99222 1ST HOSP IP/OBS MODERATE 55: CPT

## 2021-04-15 RX ORDER — TOCILIZUMAB 20 MG/ML
800 INJECTION, SOLUTION, CONCENTRATE INTRAVENOUS ONCE
Refills: 0 | Status: COMPLETED | OUTPATIENT
Start: 2021-04-15 | End: 2021-04-15

## 2021-04-15 RX ADMIN — Medication 2: at 11:57

## 2021-04-15 RX ADMIN — Medication 650 MILLIGRAM(S): at 01:56

## 2021-04-15 RX ADMIN — LOSARTAN POTASSIUM 50 MILLIGRAM(S): 100 TABLET, FILM COATED ORAL at 05:45

## 2021-04-15 RX ADMIN — TOCILIZUMAB 100 MILLIGRAM(S): 20 INJECTION, SOLUTION, CONCENTRATE INTRAVENOUS at 15:54

## 2021-04-15 RX ADMIN — PANTOPRAZOLE SODIUM 40 MILLIGRAM(S): 20 TABLET, DELAYED RELEASE ORAL at 05:45

## 2021-04-15 RX ADMIN — Medication 10 MILLILITER(S): at 01:27

## 2021-04-15 RX ADMIN — ENOXAPARIN SODIUM 40 MILLIGRAM(S): 100 INJECTION SUBCUTANEOUS at 05:45

## 2021-04-15 RX ADMIN — INSULIN GLARGINE 25 UNIT(S): 100 INJECTION, SOLUTION SUBCUTANEOUS at 21:50

## 2021-04-15 RX ADMIN — CITALOPRAM 10 MILLIGRAM(S): 10 TABLET, FILM COATED ORAL at 11:08

## 2021-04-15 RX ADMIN — Medication 2: at 08:11

## 2021-04-15 RX ADMIN — Medication 8 UNIT(S): at 08:10

## 2021-04-15 RX ADMIN — Medication 8 UNIT(S): at 16:27

## 2021-04-15 RX ADMIN — REMDESIVIR 500 MILLIGRAM(S): 5 INJECTION INTRAVENOUS at 22:27

## 2021-04-15 RX ADMIN — Medication 6 MILLIGRAM(S): at 05:45

## 2021-04-15 RX ADMIN — CHLORHEXIDINE GLUCONATE 1 APPLICATION(S): 213 SOLUTION TOPICAL at 05:44

## 2021-04-15 RX ADMIN — ENOXAPARIN SODIUM 40 MILLIGRAM(S): 100 INJECTION SUBCUTANEOUS at 17:00

## 2021-04-15 RX ADMIN — Medication 8 UNIT(S): at 11:56

## 2021-04-15 RX ADMIN — Medication 650 MILLIGRAM(S): at 01:26

## 2021-04-15 RX ADMIN — Medication 1: at 16:27

## 2021-04-15 NOTE — CONSULT NOTE ADULT - ASSESSMENT
IMPRESSION:    Acute hypoxemic respiratory failure  COVID pneumonia; s/p plasma x1  HO DM, HTN    RECOMMEND:    Continue Dexa  Continue remdesivir  Toci per ID  HFNC to target spo2>94%  Repeat covid inflammatory markers  Upgrade to step down unit  DVT prophylaxis IMPRESSION:    Acute hypoxemic respiratory failure  COVID pneumonia; s/p plasma x1  HO DM, HTN  Morbid obesity    RECOMMEND:    Continue Dexa  Continue remdesivir  Toci per ID  HFNC to target spo2>94%  Repeat covid inflammatory markers  Upgrade to step down unit  DVT prophylaxis IMPRESSION:    Acute hypoxemic respiratory failure  severe COVID pneumonia; '  Doubt bacterial superinfection  HO DM, HTN  Morbid obesity    RECOMMEND:    Continue Dexa  Continue remdesivir  Toci x1  HFNC to target spo2 88 to 94%  Repeat covid inflammatory markers  LE doppler  Upgrade to step down unit  DVT prophylaxis

## 2021-04-15 NOTE — CONSULT NOTE ADULT - SUBJECTIVE AND OBJECTIVE BOX
Patient is a 45y old  Female who presents with a chief complaint of shortness of breath (14 Apr 2021 15:35)      HPI:  45 Y F with pmh, HTN, COVID19 positive on 4/6/21 presents to ED with worsening shortness of breath. Per patient, she had exposure to COVID on 4/3, ended up testing positive on 4/6. On 4/6, she began having cough, congestion, myalgias, fatigue, and subjective fevers. Went to PMD, started on z-pack (completed course) which helped her feel better slightly. However, last night she began having worsening shortness of breath and dry cough, to the point that she was vomiting from coughing, so decided to come to ED. Denies chills, chest pain, palpitations, abdominal pain. Reports diarrhea, but only after she took a laxative.     ED Course:  T 99.4F, P 107, /87, R 20, S 93% on RA at rest. Desaturates to 89% on RA on ambulation, put on 3L NC and improved to 99%.  (12 Apr 2021 18:16)      PAST MEDICAL & SURGICAL HISTORY:  Hypertension    Diabetes mellitus        SOCIAL HX:   Smoking                         ETOH                            Other    FAMILY HISTORY:  .  No cardiovascular or pulmonary family history     Review of System:  See HPI    Allergies    No Known Allergies    Intolerances          PHYSICAL EXAM  Vital Signs Last 24 Hrs  T(C): 36.6 (15 Apr 2021 05:00), Max: 36.9 (14 Apr 2021 21:30)  T(F): 97.8 (15 Apr 2021 05:00), Max: 98.5 (14 Apr 2021 21:30)  HR: 77 (15 Apr 2021 05:00) (77 - 82)  BP: 120/77 (15 Apr 2021 05:00) (116/62 - 147/77)  BP(mean): --  RR: 18 (15 Apr 2021 05:00) (18 - 19)  SpO2: 90% (15 Apr 2021 09:03) (87% - 96%)    General: In NAD  HEENT: WOOD             Lymphatic system: No cervical LN     Lungs: Bilateral crackles  Cardiovascular: Regular  Gastrointestinal: Soft.  + BS   Musculoskeletal: No Clubbing.  Full range of motion.. Moves all extremities  Skin: Warm.  Intact  Neurological: No motor or sensory deficit       LABS:                          13.6   6.26  )-----------( 234      ( 15 Apr 2021 06:24 )             42.4                                               04-15    142  |  101  |  22<H>  ----------------------------<  162<H>  3.9   |  28  |  0.8    Ca    8.9      15 Apr 2021 06:24  Mg     2.1     04-15    TPro  7.2  /  Alb  4.0  /  TBili  0.4  /  DBili  x   /  AST  104<H>  /  ALT  82<H>  /  AlkPhos  58  04-15                                                 CARDIAC MARKERS ( 14 Apr 2021 11:53 )  x     / <0.01 ng/mL / x     / x     / x                                                LIVER FUNCTIONS - ( 15 Apr 2021 06:24 )  Alb: 4.0 g/dL / Pro: 7.2 g/dL / ALK PHOS: 58 U/L / ALT: 82 U/L / AST: 104 U/L / GGT: x                                                  Culture - Blood (collected 13 Apr 2021 11:53)  Source: .Blood None  Preliminary Report (14 Apr 2021 23:01):    No growth to date.                                                    MEDICATIONS  (STANDING):  chlorhexidine 4% Liquid 1 Application(s) Topical <User Schedule>  citalopram 10 milliGRAM(s) Oral daily  dexAMETHasone  Injectable 6 milliGRAM(s) IV Push daily  dextrose 40% Gel 15 Gram(s) Oral once  dextrose 5%. 1000 milliLiter(s) (50 mL/Hr) IV Continuous <Continuous>  dextrose 5%. 1000 milliLiter(s) (100 mL/Hr) IV Continuous <Continuous>  dextrose 50% Injectable 25 Gram(s) IV Push once  dextrose 50% Injectable 12.5 Gram(s) IV Push once  dextrose 50% Injectable 25 Gram(s) IV Push once  enoxaparin Injectable 40 milliGRAM(s) SubCutaneous every 12 hours  glucagon  Injectable 1 milliGRAM(s) IntraMuscular once  insulin glargine Injectable (LANTUS) 25 Unit(s) SubCutaneous at bedtime  insulin lispro (ADMELOG) corrective regimen sliding scale   SubCutaneous three times a day before meals  insulin lispro Injectable (ADMELOG) 8 Unit(s) SubCutaneous three times a day before meals  losartan 50 milliGRAM(s) Oral daily  pantoprazole    Tablet 40 milliGRAM(s) Oral before breakfast  remdesivir  IVPB   IV Intermittent   remdesivir  IVPB 100 milliGRAM(s) IV Intermittent every 24 hours    MEDICATIONS  (PRN):  acetaminophen   Tablet .. 650 milliGRAM(s) Oral every 6 hours PRN Temp greater or equal to 38C (100.4F), Moderate Pain (4 - 6)  guaifenesin/dextromethorphan  Syrup 10 milliLiter(s) Oral every 4 hours PRN Cough       Patient is a 45y old  Female who presents with a chief complaint of shortness of breath (14 Apr 2021 15:35)      HPI:  45 Y F with pmh, HTN, COVID19 positive on 4/6/21 presents to ED with worsening shortness of breath. Per patient, she had exposure to COVID on 4/3, ended up testing positive on 4/6. On 4/6, she began having cough, congestion, myalgias, fatigue, and subjective fevers. Went to PMD, started on z-pack (completed course) which helped her feel better slightly. However, last night she began having worsening shortness of breath and dry cough, to the point that she was vomiting from coughing, so decided to come to ED. Denies chills, chest pain, palpitations, abdominal pain. Reports diarrhea, but only after she took a laxative.     ED Course:  T 99.4F, P 107, /87, R 20, S 93% on RA at rest. Desaturates to 89% on RA on ambulation, put on 3L NC and improved to 99%.  (12 Apr 2021 18:16)      PAST MEDICAL & SURGICAL HISTORY:  Hypertension    Diabetes mellitus        SOCIAL HX:   nonsmoker    FAMILY HISTORY:  .  No cardiovascular or pulmonary family history     Review of System:  See HPI    Allergies    No Known Allergies    Intolerances          PHYSICAL EXAM  Vital Signs Last 24 Hrs  T(C): 36.6 (15 Apr 2021 05:00), Max: 36.9 (14 Apr 2021 21:30)  T(F): 97.8 (15 Apr 2021 05:00), Max: 98.5 (14 Apr 2021 21:30)  HR: 77 (15 Apr 2021 05:00) (77 - 82)  BP: 120/77 (15 Apr 2021 05:00) (116/62 - 147/77)  BP(mean): --  RR: 18 (15 Apr 2021 05:00) (18 - 19)  SpO2: 90% (15 Apr 2021 09:03) (87% - 96%)    General: In NAD  HEENT: WOOD             Lymphatic system: No cervical LN     Lungs: Bilateral crackles  Cardiovascular: Regular  Gastrointestinal: Soft.  + BS   Musculoskeletal: No Clubbing.  Full range of motion.. Moves all extremities  Skin: Warm.  Intact  Neurological: No motor or sensory deficit       LABS:                          13.6   6.26  )-----------( 234      ( 15 Apr 2021 06:24 )             42.4                                               04-15    142  |  101  |  22<H>  ----------------------------<  162<H>  3.9   |  28  |  0.8    Ca    8.9      15 Apr 2021 06:24  Mg     2.1     04-15    TPro  7.2  /  Alb  4.0  /  TBili  0.4  /  DBili  x   /  AST  104<H>  /  ALT  82<H>  /  AlkPhos  58  04-15                                                 CARDIAC MARKERS ( 14 Apr 2021 11:53 )  x     / <0.01 ng/mL / x     / x     / x                                                LIVER FUNCTIONS - ( 15 Apr 2021 06:24 )  Alb: 4.0 g/dL / Pro: 7.2 g/dL / ALK PHOS: 58 U/L / ALT: 82 U/L / AST: 104 U/L / GGT: x                                                  Culture - Blood (collected 13 Apr 2021 11:53)  Source: .Blood None  Preliminary Report (14 Apr 2021 23:01):    No growth to date.                                                    MEDICATIONS  (STANDING):  chlorhexidine 4% Liquid 1 Application(s) Topical <User Schedule>  citalopram 10 milliGRAM(s) Oral daily  dexAMETHasone  Injectable 6 milliGRAM(s) IV Push daily  dextrose 40% Gel 15 Gram(s) Oral once  dextrose 5%. 1000 milliLiter(s) (50 mL/Hr) IV Continuous <Continuous>  dextrose 5%. 1000 milliLiter(s) (100 mL/Hr) IV Continuous <Continuous>  dextrose 50% Injectable 25 Gram(s) IV Push once  dextrose 50% Injectable 12.5 Gram(s) IV Push once  dextrose 50% Injectable 25 Gram(s) IV Push once  enoxaparin Injectable 40 milliGRAM(s) SubCutaneous every 12 hours  glucagon  Injectable 1 milliGRAM(s) IntraMuscular once  insulin glargine Injectable (LANTUS) 25 Unit(s) SubCutaneous at bedtime  insulin lispro (ADMELOG) corrective regimen sliding scale   SubCutaneous three times a day before meals  insulin lispro Injectable (ADMELOG) 8 Unit(s) SubCutaneous three times a day before meals  losartan 50 milliGRAM(s) Oral daily  pantoprazole    Tablet 40 milliGRAM(s) Oral before breakfast  remdesivir  IVPB   IV Intermittent   remdesivir  IVPB 100 milliGRAM(s) IV Intermittent every 24 hours    MEDICATIONS  (PRN):  acetaminophen   Tablet .. 650 milliGRAM(s) Oral every 6 hours PRN Temp greater or equal to 38C (100.4F), Moderate Pain (4 - 6)  guaifenesin/dextromethorphan  Syrup 10 milliLiter(s) Oral every 4 hours PRN Cough       Patient is a 45y old  Female who presents with a chief complaint of shortness of breath (14 Apr 2021 15:35)      HPI:  45 Y F with pmh, HTN, COVID19 positive on 4/6/21 presents to ED with worsening shortness of breath. Per patient, she had exposure to COVID on 4/3, ended up testing positive on 4/6. On 4/6, she began having cough, congestion, myalgias, fatigue, and subjective fevers. Went to PMD, started on z-pack (completed course) which helped her feel better slightly. However, last night she began having worsening shortness of breath and dry cough, to the point that she was vomiting from coughing, so decided to come to ED. Denies chills, chest pain, palpitations, abdominal pain. Reports diarrhea, but only after she took a laxative.     ED Course:  T 99.4F, P 107, /87, R 20, S 93% on RA at rest. Desaturates to 89% on RA on ambulation, put on 3L NC and improved to 99%.  (12 Apr 2021 18:16), admitted to floor, seen by ID, called to evaluate for worsening SOB, requiring HHFNC      PAST MEDICAL & SURGICAL HISTORY:  Hypertension    Diabetes mellitus        SOCIAL HX:   nonsmoker    FAMILY HISTORY:  .  No cardiovascular or pulmonary family history     Review of System:  See HPI    Allergies    No Known Allergies    Intolerances          PHYSICAL EXAM  Vital Signs Last 24 Hrs  T(C): 36.6 (15 Apr 2021 05:00), Max: 36.9 (14 Apr 2021 21:30)  T(F): 97.8 (15 Apr 2021 05:00), Max: 98.5 (14 Apr 2021 21:30)  HR: 77 (15 Apr 2021 05:00) (77 - 82)  BP: 120/77 (15 Apr 2021 05:00) (116/62 - 147/77)  BP(mean): --  RR: 18 (15 Apr 2021 05:00) (18 - 19)  SpO2: 90% (15 Apr 2021 09:03) (87% - 96%)    General: Ill looking  HEENT: WOOD             Lymphatic system: No cervical LN     Lungs: Bilateral crackles  Cardiovascular: Regular  Gastrointestinal: Soft.  + BS   Musculoskeletal: No Clubbing.  Full range of motion.. Moves all extremities  Skin: Warm.  Intact  Neurological: No motor or sensory deficit       LABS:                          13.6   6.26  )-----------( 234      ( 15 Apr 2021 06:24 )             42.4                                               04-15    142  |  101  |  22<H>  ----------------------------<  162<H>  3.9   |  28  |  0.8    Ca    8.9      15 Apr 2021 06:24  Mg     2.1     04-15    TPro  7.2  /  Alb  4.0  /  TBili  0.4  /  DBili  x   /  AST  104<H>  /  ALT  82<H>  /  AlkPhos  58  04-15                                                 CARDIAC MARKERS ( 14 Apr 2021 11:53 )  x     / <0.01 ng/mL / x     / x     / x                                                LIVER FUNCTIONS - ( 15 Apr 2021 06:24 )  Alb: 4.0 g/dL / Pro: 7.2 g/dL / ALK PHOS: 58 U/L / ALT: 82 U/L / AST: 104 U/L / GGT: x                                                  Culture - Blood (collected 13 Apr 2021 11:53)  Source: .Blood None  Preliminary Report (14 Apr 2021 23:01):    No growth to date.                                                    MEDICATIONS  (STANDING):  chlorhexidine 4% Liquid 1 Application(s) Topical <User Schedule>  citalopram 10 milliGRAM(s) Oral daily  dexAMETHasone  Injectable 6 milliGRAM(s) IV Push daily  dextrose 40% Gel 15 Gram(s) Oral once  dextrose 5%. 1000 milliLiter(s) (50 mL/Hr) IV Continuous <Continuous>  dextrose 5%. 1000 milliLiter(s) (100 mL/Hr) IV Continuous <Continuous>  dextrose 50% Injectable 25 Gram(s) IV Push once  dextrose 50% Injectable 12.5 Gram(s) IV Push once  dextrose 50% Injectable 25 Gram(s) IV Push once  enoxaparin Injectable 40 milliGRAM(s) SubCutaneous every 12 hours  glucagon  Injectable 1 milliGRAM(s) IntraMuscular once  insulin glargine Injectable (LANTUS) 25 Unit(s) SubCutaneous at bedtime  insulin lispro (ADMELOG) corrective regimen sliding scale   SubCutaneous three times a day before meals  insulin lispro Injectable (ADMELOG) 8 Unit(s) SubCutaneous three times a day before meals  losartan 50 milliGRAM(s) Oral daily  pantoprazole    Tablet 40 milliGRAM(s) Oral before breakfast  remdesivir  IVPB   IV Intermittent   remdesivir  IVPB 100 milliGRAM(s) IV Intermittent every 24 hours    MEDICATIONS  (PRN):  acetaminophen   Tablet .. 650 milliGRAM(s) Oral every 6 hours PRN Temp greater or equal to 38C (100.4F), Moderate Pain (4 - 6)  guaifenesin/dextromethorphan  Syrup 10 milliLiter(s) Oral every 4 hours PRN Cough

## 2021-04-15 NOTE — PATIENT PROFILE ADULT - ARE SIGNIFICANT INDICATORS COMPLETE.
"Oncology Rooming Note    July 13, 2018 10:55 AM   Mary Chadwick is a 41 year old female who presents for:    Chief Complaint   Patient presents with     Oncology Clinic Visit     Return: Breast CA     Initial Vitals: /75  Pulse 62  Temp 97.9  F (36.6  C) (Oral)  Resp 16  Ht 1.727 m (5' 8\")  Wt 77.3 kg (170 lb 7 oz)  SpO2 98%  Breastfeeding? No  BMI 25.91 kg/m2 Estimated body mass index is 25.91 kg/(m^2) as calculated from the following:    Height as of this encounter: 1.727 m (5' 8\").    Weight as of this encounter: 77.3 kg (170 lb 7 oz). Body surface area is 1.93 meters squared.  Mild Pain (2) Comment: armpit; sorethroat 5   No LMP recorded. Patient is not currently having periods (Reason: Premenopausal).  Allergies reviewed: Yes  Medications reviewed: Yes    Medications: MEDICATION REFILLS NEEDED TODAY. Provider was notified. [facility]  Pharmacy name entered into [facility]:    [facility] [place], [place] - [address] [place] [address] [facility] [place], [place] - [address] [facility]    Clinical concerns: new concerns are the left armpit is painful and she noticed it the day before yesterday, July 11th and she has a sore throat, with left ear pain.  Dr. Gann was notified.    10 minutes for nursing intake (face to face time)     Arron Borjas CMA              " Yes

## 2021-04-15 NOTE — CONSULT NOTE ADULT - ATTENDING COMMENTS
patient seen and examined, agree with above, Acute hypoxemic resp failure/ severe covid pneumonia/ on HHFNC, Keep DEcadron, trend markers, SDU

## 2021-04-15 NOTE — PROGRESS NOTE ADULT - SUBJECTIVE AND OBJECTIVE BOX
PAULSONLIZ NGUYEN  45y, Female  Allergy: No Known Allergies      LOS  3d    CHIEF COMPLAINT: shortness of breath (15 Apr 2021 09:36)      INTERVAL EVENTS/HPI  - HFNC  - T(F): , Max: 98.5 (04-14-21 @ 21:30)  - WBC Count: 6.26 (04-15-21 @ 06:24)  WBC Count: 7.65 (04-14-21 @ 08:03)     - Creatinine, Serum: 0.8 (04-15-21 @ 06:24)  Creatinine, Serum: 0.6 (04-14-21 @ 08:03)       COVID-19 [place] Domain AB Interp: Negative (04-13-21 @ 06:50)      ROS:  9-point ROS performed and negative except as per above    VITALS:  T(F): 97.8, Max: 98.5 (04-14-21 @ 21:30)  HR: 77  BP: 120/77  RR: 18Vital Signs Last 24 Hrs  T(C): 36.6 (15 Apr 2021 05:00), Max: 36.9 (14 Apr 2021 21:30)  T(F): 97.8 (15 Apr 2021 05:00), Max: 98.5 (14 Apr 2021 21:30)  HR: 77 (15 Apr 2021 09:50) (77 - 82)  BP: 120/77 (15 Apr 2021 05:00) (116/62 - 147/77)  BP(mean): --  RR: 18 (15 Apr 2021 05:00) (18 - 19)  SpO2: 96% (15 Apr 2021 09:50) (87% - 96%)    FH: Non-contributory  Social Hx: Non-contributory    TESTS & MEASUREMENTS:                        13.6   6.26  )-----------( 234      ( 15 Apr 2021 06:24 )             42.4     04-15    142  |  101  |  22<H>  ----------------------------<  162<H>  3.9   |  28  |  0.8    Ca    8.9      15 Apr 2021 06:24  Mg     2.1     04-15    TPro  7.2  /  Alb  4.0  /  TBili  0.4  /  DBili  x   /  AST  104<H>  /  ALT  82<H>  /  AlkPhos  58  04-15    eGFR if Non African American: 90 mL/min/1.73M2 (04-15-21 @ 06:24)  eGFR if : 104 mL/min/1.73M2 (04-15-21 @ 06:24)    LIVER FUNCTIONS - ( 15 Apr 2021 06:24 )  Alb: 4.0 g/dL / Pro: 7.2 g/dL / ALK PHOS: 58 U/L / ALT: 82 U/L / AST: 104 U/L / GGT: x               Culture - Blood (collected 04-13-21 @ 11:53)  Source: .Blood None  Preliminary Report (04-14-21 @ 23:01):    No growth to date.        Lactate, Blood: 1.2 mmol/L (04-12-21 @ 21:55)  Lactate, Blood: 1.3 mmol/L (04-12-21 @ 15:00)      INFECTIOUS DISEASES TESTING  Procalcitonin, Serum: 0.06 (04-14-21 @ 11:53)  Procalcitonin, Serum: 0.11 (04-12-21 @ 15:00)  COVID-19 PCR: Detected (04-12-21 @ 14:33)      INFLAMMATORY MARKERS  C-Reactive Protein, Serum: 42 mg/L (04-14-21 @ 11:53)  C-Reactive Protein, Serum: 52 mg/L (04-12-21 @ 15:00)      RADIOLOGY & ADDITIONAL TESTS:  I have personally reviewed the last available Chest xray  CXR  Xray Chest 1 View- PORTABLE-Urgent:   EXAM:  XR CHEST PORTABLE URGENT 1V            PROCEDURE DATE:  04/14/2021            INTERPRETATION:  Clinical History / Reason for exam: Viral pneumonia.    Comparison : Chest radiograph prior day.    Technique/Positioning: Frontal portable, low lung volumes.    Findings:    Support devices: None.    Cardiac/mediastinum/hilum: Unchanged    Lung parenchyma/Pleura: Bilateral opacities    Skeleton/soft tissues: Unchanged    Impression:    Bilateral opacifications without difference.                  YUE THORNTON MD; Attending Interventional Radiologist  This document has been electronically signed. Apr 14 2021 12:10PM (04-14-21 @ 11:56)      CT      CARDIOLOGY TESTING  12 Lead ECG:   Ventricular Rate 81 BPM    Atrial Rate 81 BPM    P-R Interval 152 ms    QRS Duration 90 ms    Q-T Interval 376 ms    QTC Calculation(Bazett) 436 ms    P Axis 58 degrees    R Axis 8 degrees    T Axis 21 degrees    Diagnosis Line Normal sinus rhythm  Nonspecific T wave abnormality  Abnormal ECG    Confirmed by IMANI CAMPOS MD ([id number]) on 4/14/2021 9:27:11 AM (04-14-21 @ 07:59)  12 Lead ECG:   Ventricular Rate 104 BPM    Atrial Rate 104 BPM    P-R Interval 144 ms    QRS Duration 90 ms    Q-T Interval 344 ms    QTC Calculation(Bazett) 452 ms    P Axis 60 degrees    R Axis 116 degrees    T Axis 24 degrees    Diagnosis Line Sinus tachycardia  Right axis deviation  Cannot rule out Anterior infarct , age undetermined  Abnormal ECG    Confirmed by ZAHRAA NUNES MD ([id number]) on 4/12/2021 4:16:24 PM (04-12-21 @ 15:16)      MEDICATIONS  chlorhexidine 4% Liquid 1 Topical <User Schedule>  citalopram 10 Oral daily  dexAMETHasone  Injectable 6 IV Push daily  dextrose 40% Gel 15 Oral once  dextrose 5%. 1000 IV Continuous <Continuous>  dextrose 5%. 1000 IV Continuous <Continuous>  dextrose 50% Injectable 25 IV Push once  dextrose 50% Injectable 12.5 IV Push once  dextrose 50% Injectable 25 IV Push once  enoxaparin Injectable 40 SubCutaneous every 12 hours  glucagon  Injectable 1 IntraMuscular once  insulin glargine Injectable (LANTUS) 25 SubCutaneous at bedtime  insulin lispro (ADMELOG) corrective regimen sliding scale  SubCutaneous three times a day before meals  insulin lispro Injectable (ADMELOG) 8 SubCutaneous three times a day before meals  losartan 50 Oral daily  pantoprazole    Tablet 40 Oral before breakfast  remdesivir  IVPB  IV Intermittent   remdesivir  IVPB 100 IV Intermittent every 24 hours  tocilizumab IVPB 800 IV Intermittent once      WEIGHT  Weight (kg): 108.9 (04-12-21 @ 21:44)  Creatinine, Serum: 0.8 mg/dL (04-15-21 @ 06:24)      ANTIBIOTICS:  remdesivir  IVPB   IV Intermittent   remdesivir  IVPB 100 milliGRAM(s) IV Intermittent every 24 hours      All available historical records have been reviewed

## 2021-04-15 NOTE — PROGRESS NOTE ADULT - SUBJECTIVE AND OBJECTIVE BOX
LIZ PAULSON  45y  Female      Patient is a 45y old  Female who presents with a chief complaint of shortness of breath.      INTERVAL HPI/OVERNIGHT EVENTS: Noted the event of increased O2 demand.       ******************************* REVIEW OF SYSTEMS:**********************************************  All other review of systems negative    *********************** VITALS ******************************************    T(F): 97.8 (04-15-21 @ 05:00)  HR: 77 (04-15-21 @ 09:50) (77 - 80)  BP: 120/77 (04-15-21 @ 05:00) (120/77 - 147/77)  RR: 18 (04-15-21 @ 05:00) (18 - 19)  SpO2: 96% (04-15-21 @ 09:50) (87% - 96%)    04-14-21 @ 07:01  -  04-15-21 @ 07:00  --------------------------------------------------------  IN: 0 mL / OUT: 400 mL / NET: -400 mL    04-15-21 @ 07:01  -  04-15-21 @ 13:49  --------------------------------------------------------  IN: 240 mL / OUT: 550 mL / NET: -310 mL            04-14-21 @ 07:01  -  04-15-21 @ 07:00  --------------------------------------------------------  IN: 0 mL / OUT: 400 mL / NET: -400 mL    04-15-21 @ 07:01  -  04-15-21 @ 13:49  --------------------------------------------------------  IN: 240 mL / OUT: 550 mL / NET: -310 mL        ******************************** PHYSICAL EXAM:**************************************************  GENERAL: In respiratory distress.     PSYCH: no agitation, baseline mentation  HEENT:     NERVOUS SYSTEM:  Alert & Oriented X3,   PULMONARY: FARIBA, decreased     CARDIOVASCULAR: S1S2 RRR    GI: Soft, NT, ND; BS present.    EXTREMITIES:  2+ Peripheral Pulses, No clubbing, cyanosis, or edema    LYMPH: No lymphadenopathy noted    SKIN: No rashes or lesions      **************************** LABS *******************************************************                          13.6   6.26  )-----------( 234      ( 15 Apr 2021 06:24 )             42.4     04-15    142  |  101  |  22<H>  ----------------------------<  162<H>  3.9   |  28  |  0.8    Ca    8.9      15 Apr 2021 06:24  Mg     2.1     04-15    TPro  7.2  /  Alb  4.0  /  TBili  0.4  /  DBili  x   /  AST  104<H>  /  ALT  82<H>  /  AlkPhos  58  04-15    ABG - ( 15 Apr 2021 09:30 )  pH, Arterial: 7.47  pH, Blood: x     /  pCO2: 44    /  pO2: 79    / HCO3: 32    / Base Excess: 7.6   /  SaO2: 96                    Lactate Trend  04-12 @ 21:55 Lactate:1.2   04-12 @ 15:00 Lactate:1.3     CARDIAC MARKERS ( 14 Apr 2021 11:53 )  x     / <0.01 ng/mL / x     / x     / x          CAPILLARY BLOOD GLUCOSE      POCT Blood Glucose.: 229 mg/dL (15 Apr 2021 11:53)          **************************Active Medications *******************************************  No Known Allergies      acetaminophen   Tablet .. 650 milliGRAM(s) Oral every 6 hours PRN  chlorhexidine 4% Liquid 1 Application(s) Topical <User Schedule>  citalopram 10 milliGRAM(s) Oral daily  dexAMETHasone  Injectable 6 milliGRAM(s) IV Push daily  dextrose 40% Gel 15 Gram(s) Oral once  dextrose 5%. 1000 milliLiter(s) IV Continuous <Continuous>  dextrose 5%. 1000 milliLiter(s) IV Continuous <Continuous>  dextrose 50% Injectable 25 Gram(s) IV Push once  dextrose 50% Injectable 12.5 Gram(s) IV Push once  dextrose 50% Injectable 25 Gram(s) IV Push once  enoxaparin Injectable 40 milliGRAM(s) SubCutaneous every 12 hours  glucagon  Injectable 1 milliGRAM(s) IntraMuscular once  guaifenesin/dextromethorphan  Syrup 10 milliLiter(s) Oral every 4 hours PRN  insulin glargine Injectable (LANTUS) 25 Unit(s) SubCutaneous at bedtime  insulin lispro (ADMELOG) corrective regimen sliding scale   SubCutaneous three times a day before meals  insulin lispro Injectable (ADMELOG) 8 Unit(s) SubCutaneous three times a day before meals  losartan 50 milliGRAM(s) Oral daily  pantoprazole    Tablet 40 milliGRAM(s) Oral before breakfast  remdesivir  IVPB   IV Intermittent   remdesivir  IVPB 100 milliGRAM(s) IV Intermittent every 24 hours  tocilizumab IVPB 800 milliGRAM(s) IV Intermittent once      ***************************************************  RADIOLOGY & ADDITIONAL TESTS:    Imaging Personally Reviewed:  [ ] YES  [ ] NO    HEALTH ISSUES - PROBLEM Dx:

## 2021-04-16 LAB
ALBUMIN SERPL ELPH-MCNC: 3.5 G/DL — SIGNIFICANT CHANGE UP (ref 3.5–5.2)
ALP SERPL-CCNC: 54 U/L — SIGNIFICANT CHANGE UP (ref 30–115)
ALT FLD-CCNC: 210 U/L — HIGH (ref 0–41)
ANION GAP SERPL CALC-SCNC: 13 MMOL/L — SIGNIFICANT CHANGE UP (ref 7–14)
AST SERPL-CCNC: 174 U/L — HIGH (ref 0–41)
BASOPHILS # BLD AUTO: 0.01 K/UL — SIGNIFICANT CHANGE UP (ref 0–0.2)
BASOPHILS NFR BLD AUTO: 0.2 % — SIGNIFICANT CHANGE UP (ref 0–1)
BILIRUB SERPL-MCNC: 0.5 MG/DL — SIGNIFICANT CHANGE UP (ref 0.2–1.2)
BUN SERPL-MCNC: 21 MG/DL — HIGH (ref 10–20)
CALCIUM SERPL-MCNC: 8.4 MG/DL — LOW (ref 8.5–10.1)
CHLORIDE SERPL-SCNC: 100 MMOL/L — SIGNIFICANT CHANGE UP (ref 98–110)
CO2 SERPL-SCNC: 27 MMOL/L — SIGNIFICANT CHANGE UP (ref 17–32)
CREAT SERPL-MCNC: 0.5 MG/DL — LOW (ref 0.7–1.5)
CRP SERPL-MCNC: 11 MG/L — HIGH
D DIMER BLD IA.RAPID-MCNC: 153 NG/ML DDU — SIGNIFICANT CHANGE UP (ref 0–230)
EOSINOPHIL # BLD AUTO: 0 K/UL — SIGNIFICANT CHANGE UP (ref 0–0.7)
EOSINOPHIL NFR BLD AUTO: 0 % — SIGNIFICANT CHANGE UP (ref 0–8)
FERRITIN SERPL-MCNC: 1562 NG/ML — HIGH (ref 15–150)
GLUCOSE BLDC GLUCOMTR-MCNC: 144 MG/DL — HIGH (ref 70–99)
GLUCOSE BLDC GLUCOMTR-MCNC: 147 MG/DL — HIGH (ref 70–99)
GLUCOSE BLDC GLUCOMTR-MCNC: 165 MG/DL — HIGH (ref 70–99)
GLUCOSE BLDC GLUCOMTR-MCNC: 252 MG/DL — HIGH (ref 70–99)
GLUCOSE SERPL-MCNC: 157 MG/DL — HIGH (ref 70–99)
HCT VFR BLD CALC: 39.5 % — SIGNIFICANT CHANGE UP (ref 37–47)
HGB BLD-MCNC: 12.9 G/DL — SIGNIFICANT CHANGE UP (ref 12–16)
IMM GRANULOCYTES NFR BLD AUTO: 1.1 % — HIGH (ref 0.1–0.3)
LYMPHOCYTES # BLD AUTO: 1.89 K/UL — SIGNIFICANT CHANGE UP (ref 1.2–3.4)
LYMPHOCYTES # BLD AUTO: 39.8 % — SIGNIFICANT CHANGE UP (ref 20.5–51.1)
MAGNESIUM SERPL-MCNC: 2 MG/DL — SIGNIFICANT CHANGE UP (ref 1.8–2.4)
MCHC RBC-ENTMCNC: 28.5 PG — SIGNIFICANT CHANGE UP (ref 27–31)
MCHC RBC-ENTMCNC: 32.7 G/DL — SIGNIFICANT CHANGE UP (ref 32–37)
MCV RBC AUTO: 87.2 FL — SIGNIFICANT CHANGE UP (ref 81–99)
MONOCYTES # BLD AUTO: 0.41 K/UL — SIGNIFICANT CHANGE UP (ref 0.1–0.6)
MONOCYTES NFR BLD AUTO: 8.6 % — SIGNIFICANT CHANGE UP (ref 1.7–9.3)
NEUTROPHILS # BLD AUTO: 2.39 K/UL — SIGNIFICANT CHANGE UP (ref 1.4–6.5)
NEUTROPHILS NFR BLD AUTO: 50.3 % — SIGNIFICANT CHANGE UP (ref 42.2–75.2)
NRBC # BLD: 0 /100 WBCS — SIGNIFICANT CHANGE UP (ref 0–0)
PLATELET # BLD AUTO: 256 K/UL — SIGNIFICANT CHANGE UP (ref 130–400)
POTASSIUM SERPL-MCNC: 4.2 MMOL/L — SIGNIFICANT CHANGE UP (ref 3.5–5)
POTASSIUM SERPL-SCNC: 4.2 MMOL/L — SIGNIFICANT CHANGE UP (ref 3.5–5)
PROT SERPL-MCNC: 6.6 G/DL — SIGNIFICANT CHANGE UP (ref 6–8)
RBC # BLD: 4.53 M/UL — SIGNIFICANT CHANGE UP (ref 4.2–5.4)
RBC # FLD: 12.9 % — SIGNIFICANT CHANGE UP (ref 11.5–14.5)
SODIUM SERPL-SCNC: 140 MMOL/L — SIGNIFICANT CHANGE UP (ref 135–146)
WBC # BLD: 4.75 K/UL — LOW (ref 4.8–10.8)
WBC # FLD AUTO: 4.75 K/UL — LOW (ref 4.8–10.8)

## 2021-04-16 PROCEDURE — 99233 SBSQ HOSP IP/OBS HIGH 50: CPT

## 2021-04-16 PROCEDURE — 71045 X-RAY EXAM CHEST 1 VIEW: CPT | Mod: 26

## 2021-04-16 PROCEDURE — 99232 SBSQ HOSP IP/OBS MODERATE 35: CPT

## 2021-04-16 RX ADMIN — Medication 3: at 11:52

## 2021-04-16 RX ADMIN — Medication 1: at 16:39

## 2021-04-16 RX ADMIN — Medication 10 MILLILITER(S): at 17:35

## 2021-04-16 RX ADMIN — Medication 8 UNIT(S): at 08:03

## 2021-04-16 RX ADMIN — INSULIN GLARGINE 25 UNIT(S): 100 INJECTION, SOLUTION SUBCUTANEOUS at 23:21

## 2021-04-16 RX ADMIN — Medication 10 MILLILITER(S): at 05:38

## 2021-04-16 RX ADMIN — Medication 6 MILLIGRAM(S): at 05:38

## 2021-04-16 RX ADMIN — LOSARTAN POTASSIUM 50 MILLIGRAM(S): 100 TABLET, FILM COATED ORAL at 05:38

## 2021-04-16 RX ADMIN — ENOXAPARIN SODIUM 40 MILLIGRAM(S): 100 INJECTION SUBCUTANEOUS at 05:39

## 2021-04-16 RX ADMIN — PANTOPRAZOLE SODIUM 40 MILLIGRAM(S): 20 TABLET, DELAYED RELEASE ORAL at 08:03

## 2021-04-16 RX ADMIN — Medication 8 UNIT(S): at 16:39

## 2021-04-16 RX ADMIN — ENOXAPARIN SODIUM 40 MILLIGRAM(S): 100 INJECTION SUBCUTANEOUS at 17:05

## 2021-04-16 RX ADMIN — REMDESIVIR 500 MILLIGRAM(S): 5 INJECTION INTRAVENOUS at 22:17

## 2021-04-16 RX ADMIN — CITALOPRAM 10 MILLIGRAM(S): 10 TABLET, FILM COATED ORAL at 11:53

## 2021-04-16 RX ADMIN — Medication 8 UNIT(S): at 11:52

## 2021-04-16 NOTE — PROGRESS NOTE ADULT - TIME BILLING
Discussed with primary team. Severe COVID PNA

## 2021-04-16 NOTE — PROGRESS NOTE ADULT - SUBJECTIVE AND OBJECTIVE BOX
PAULSONLIZ CONTRERAS  45y  Female      Patient is a 45y old  Female who presents with a chief complaint of shortness of breath.      INTERVAL HPI/OVERNIGHT EVENTS: Noted the event of transfer/ upgrade to [facility].  Seems w/o distress.       ******************************* REVIEW OF SYSTEMS:**********************************************    All other review of systems negative    *********************** VITALS ******************************************    T(F): 97.6 (04-16-21 @ 16:02)  HR: 90 (04-16-21 @ 16:24) (72 - 96)  BP: 143/84 (04-16-21 @ 16:02) (137/64 - 160/81)  RR: 22 (04-16-21 @ 16:24) (20 - 35)  SpO2: 96% (04-16-21 @ 16:24) (91% - 98%)    04-15-21 @ 07:01  -  04-16-21 @ 07:00  --------------------------------------------------------  IN: 240 mL / OUT: 550 mL / NET: -310 mL            04-15-21 @ 07:01  -  04-16-21 @ 07:00  --------------------------------------------------------  IN: 240 mL / OUT: 550 mL / NET: -310 mL        ******************************** PHYSICAL EXAM:**************************************************  GENERAL: NAD    PSYCH: no agitation, baseline mentation  HEENT:     NERVOUS SYSTEM:  Alert & Oriented X3,   PULMONARY: FARIBA, remains decreased.     CARDIOVASCULAR: S1S2 RRR    GI: Soft, NT, ND; BS present.    EXTREMITIES:  2+ Peripheral Pulses, No clubbing, cyanosis, or edema    LYMPH: No lymphadenopathy noted    SKIN: No rashes or lesions      **************************** LABS *******************************************************                          12.9   4.75  )-----------( 256      ( 16 Apr 2021 07:56 )             39.5     04-16    140  |  100  |  21<H>  ----------------------------<  157<H>  4.2   |  27  |  0.5<L>    Ca    8.4<L>      16 Apr 2021 07:56  Mg     2.0     04-16    TPro  6.6  /  Alb  3.5  /  TBili  0.5  /  DBili  x   /  AST  174<H>  /  ALT  210<H>  /  AlkPhos  54  04-16    ABG - ( 15 Apr 2021 09:30 )  pH, Arterial: 7.47  pH, Blood: x     /  pCO2: 44    /  pO2: 79    / HCO3: 32    / Base Excess: 7.6   /  SaO2: 96                    Lactate Trend  04-12 @ 21:55 Lactate:1.2   04-12 @ 15:00 Lactate:1.3         CAPILLARY BLOOD GLUCOSE      POCT Blood Glucose.: 165 mg/dL (16 Apr 2021 15:42)          **************************Active Medications *******************************************  No Known Allergies      acetaminophen   Tablet .. 650 milliGRAM(s) Oral every 6 hours PRN  chlorhexidine 4% Liquid 1 Application(s) Topical <User Schedule>  citalopram 10 milliGRAM(s) Oral daily  dexAMETHasone  Injectable 6 milliGRAM(s) IV Push daily  dextrose 40% Gel 15 Gram(s) Oral once  dextrose 5%. 1000 milliLiter(s) IV Continuous <Continuous>  dextrose 5%. 1000 milliLiter(s) IV Continuous <Continuous>  dextrose 50% Injectable 25 Gram(s) IV Push once  dextrose 50% Injectable 12.5 Gram(s) IV Push once  dextrose 50% Injectable 25 Gram(s) IV Push once  enoxaparin Injectable 40 milliGRAM(s) SubCutaneous every 12 hours  glucagon  Injectable 1 milliGRAM(s) IntraMuscular once  guaifenesin/dextromethorphan  Syrup 10 milliLiter(s) Oral every 4 hours PRN  insulin glargine Injectable (LANTUS) 25 Unit(s) SubCutaneous at bedtime  insulin lispro (ADMELOG) corrective regimen sliding scale   SubCutaneous three times a day before meals  insulin lispro Injectable (ADMELOG) 8 Unit(s) SubCutaneous three times a day before meals  losartan 50 milliGRAM(s) Oral daily  pantoprazole    Tablet 40 milliGRAM(s) Oral before breakfast  remdesivir  IVPB   IV Intermittent   remdesivir  IVPB 100 milliGRAM(s) IV Intermittent every 24 hours      ***************************************************  RADIOLOGY & ADDITIONAL TESTS:    Imaging Personally Reviewed:  [ ] YES  [ ] NO    HEALTH ISSUES - PROBLEM Dx:

## 2021-04-16 NOTE — PROGRESS NOTE ADULT - SUBJECTIVE AND OBJECTIVE BOX
Patient is a 45y old  Female who presents with a chief complaint of shortness of breath (15 Apr 2021 13:49)        Over Night Events:  On HFNCO2.  60 liters 100 %         ROS:     All ROS are negative except HPI         PHYSICAL EXAM    ICU Vital Signs Last 24 Hrs  T(C): 36.6 (16 Apr 2021 05:40), Max: 37.1 (15 Apr 2021 13:30)  T(F): 97.9 (16 Apr 2021 05:40), Max: 98.8 (15 Apr 2021 13:30)  HR: 72 (16 Apr 2021 05:40) (72 - 81)  BP: 160/81 (16 Apr 2021 05:40) (136/83 - 160/81)  BP(mean): --  ABP: --  ABP(mean): --  RR: 93 (15 Apr 2021 15:07) (18 - 93)  SpO2: 97% (16 Apr 2021 05:40) (90% - 97%)      CONSTITUTIONAL:  Well nourished.  NAD    ENT:   Airway patent,   Mouth with normal mucosa.   No thrush    EYES:   Pupils equal,   Round and reactive to light.    CARDIAC:   Normal rate,   Regular rhythm.    No edema      Vascular:  Normal systolic impulse  No Carotid bruits    RESPIRATORY:   No wheezing  Bilateral crackles   Normal chest expansion  Not tachypneic,  No use of accessory muscles    GASTROINTESTINAL:  Abdomen soft,   Non-tender,   No guarding,   + BS    MUSCULOSKELETAL:   Range of motion is not limited,  No clubbing, cyanosis    NEUROLOGICAL:   Alert and oriented   No motor  deficits.    SKIN:   Skin normal color for race,   Warm and dry  No evidence of rash.    PSYCHIATRIC:   Normal mood and affect.   No apparent risk to self or others.    HEMATOLOGICAL:  No cervical  lymphadenopathy.  no inguinal lymphadenopathy      04-15-21 @ 07:01  -  04-16-21 @ 07:00  --------------------------------------------------------  IN:    Oral Fluid: 240 mL  Total IN: 240 mL    OUT:    Voided (mL): 550 mL  Total OUT: 550 mL    Total NET: -310 mL          LABS:                            13.6   6.26  )-----------( 234      ( 15 Apr 2021 06:24 )             42.4                                               04-15    142  |  101  |  22<H>  ----------------------------<  162<H>  3.9   |  28  |  0.8    Ca    8.9      15 Apr 2021 06:24  Mg     2.1     04-15    TPro  7.2  /  Alb  4.0  /  TBili  0.4  /  DBili  x   /  AST  104<H>  /  ALT  82<H>  /  AlkPhos  58  04-15                                                 CARDIAC MARKERS ( 14 Apr 2021 11:53 )  x     / <0.01 ng/mL / x     / x     / x                                                LIVER FUNCTIONS - ( 15 Apr 2021 06:24 )  Alb: 4.0 g/dL / Pro: 7.2 g/dL / ALK PHOS: 58 U/L / ALT: 82 U/L / AST: 104 U/L / GGT: x                                                  Culture - Blood (collected 13 Apr 2021 11:53)  Source: .Blood None  Preliminary Report (14 Apr 2021 23:01):    No growth to date.                                                                                       ABG - ( 15 Apr 2021 09:30 )  pH, Arterial: 7.47  pH, Blood: x     /  pCO2: 44    /  pO2: 79    / HCO3: 32    / Base Excess: 7.6   /  SaO2: 96                  MEDICATIONS  (STANDING):  chlorhexidine 4% Liquid 1 Application(s) Topical <User Schedule>  citalopram 10 milliGRAM(s) Oral daily  dexAMETHasone  Injectable 6 milliGRAM(s) IV Push daily  dextrose 40% Gel 15 Gram(s) Oral once  dextrose 5%. 1000 milliLiter(s) (50 mL/Hr) IV Continuous <Continuous>  dextrose 5%. 1000 milliLiter(s) (100 mL/Hr) IV Continuous <Continuous>  dextrose 50% Injectable 25 Gram(s) IV Push once  dextrose 50% Injectable 12.5 Gram(s) IV Push once  dextrose 50% Injectable 25 Gram(s) IV Push once  enoxaparin Injectable 40 milliGRAM(s) SubCutaneous every 12 hours  glucagon  Injectable 1 milliGRAM(s) IntraMuscular once  insulin glargine Injectable (LANTUS) 25 Unit(s) SubCutaneous at bedtime  insulin lispro (ADMELOG) corrective regimen sliding scale   SubCutaneous three times a day before meals  insulin lispro Injectable (ADMELOG) 8 Unit(s) SubCutaneous three times a day before meals  losartan 50 milliGRAM(s) Oral daily  pantoprazole    Tablet 40 milliGRAM(s) Oral before breakfast  remdesivir  IVPB 100 milliGRAM(s) IV Intermittent every 24 hours  remdesivir  IVPB   IV Intermittent     MEDICATIONS  (PRN):  acetaminophen   Tablet .. 650 milliGRAM(s) Oral every 6 hours PRN Temp greater or equal to 38C (100.4F), Moderate Pain (4 - 6)  guaifenesin/dextromethorphan  Syrup 10 milliLiter(s) Oral every 4 hours PRN Cough      New X-rays reviewed:                                                                                  ECHO    CXR interpreted by me:  Bilateral infiltrates

## 2021-04-16 NOTE — PROGRESS NOTE ADULT - NSREFPHYEXINPTDOCREFER_GEN_ALL_CORE
my previous exam; resident/attending AM exam

## 2021-04-16 NOTE — PROGRESS NOTE ADULT - SUBJECTIVE AND OBJECTIVE BOX
LIZ PAULSON 45y Female  MRN#: [id number]   CODE STATUS:________      SUBJECTIVE  Patient is a 45y old Female who presents with a chief complaint of shortness of breath (16 Apr 2021 08:59)  Currently admitted to medicine with the primary diagnosis of COVID-19      Today is hospital day 4d.  No acute events overnight.          OBJECTIVE  PAST MEDICAL & SURGICAL HISTORY  Hypertension    Diabetes mellitus      ALLERGIES:  No Known Allergies    MEDICATIONS:  STANDING MEDICATIONS  chlorhexidine 4% Liquid 1 Application(s) Topical <User Schedule>  citalopram 10 milliGRAM(s) Oral daily  dexAMETHasone  Injectable 6 milliGRAM(s) IV Push daily  dextrose 40% Gel 15 Gram(s) Oral once  dextrose 5%. 1000 milliLiter(s) IV Continuous <Continuous>  dextrose 5%. 1000 milliLiter(s) IV Continuous <Continuous>  dextrose 50% Injectable 25 Gram(s) IV Push once  dextrose 50% Injectable 12.5 Gram(s) IV Push once  dextrose 50% Injectable 25 Gram(s) IV Push once  enoxaparin Injectable 40 milliGRAM(s) SubCutaneous every 12 hours  glucagon  Injectable 1 milliGRAM(s) IntraMuscular once  insulin glargine Injectable (LANTUS) 25 Unit(s) SubCutaneous at bedtime  insulin lispro (ADMELOG) corrective regimen sliding scale   SubCutaneous three times a day before meals  insulin lispro Injectable (ADMELOG) 8 Unit(s) SubCutaneous three times a day before meals  losartan 50 milliGRAM(s) Oral daily  pantoprazole    Tablet 40 milliGRAM(s) Oral before breakfast  remdesivir  IVPB   IV Intermittent   remdesivir  IVPB 100 milliGRAM(s) IV Intermittent every 24 hours    PRN MEDICATIONS  acetaminophen   Tablet .. 650 milliGRAM(s) Oral every 6 hours PRN  guaifenesin/dextromethorphan  Syrup 10 milliLiter(s) Oral every 4 hours PRN      VITAL SIGNS: Last 24 Hours  T(C): 36.2 (16 Apr 2021 12:00), Max: 37.1 (15 Apr 2021 15:07)  T(F): 97.1 (16 Apr 2021 12:00), Max: 98.8 (15 Apr 2021 15:07)  HR: 76 (16 Apr 2021 12:00) (72 - 80)  BP: 137/64 (16 Apr 2021 12:00) (137/64 - 160/81)  BP(mean): 92 (16 Apr 2021 12:00) (92 - 106)  RR: 35 (16 Apr 2021 12:00) (20 - 93)  SpO2: 98% (16 Apr 2021 12:00) (91% - 98%)    LABS:                        12.9   4.75  )-----------( 256      ( 16 Apr 2021 07:56 )             39.5     04-16    140  |  100  |  21<H>  ----------------------------<  157<H>  4.2   |  27  |  0.5<L>    Ca    8.4<L>      16 Apr 2021 07:56  Mg     2.0     04-16    TPro  6.6  /  Alb  3.5  /  TBili  0.5  /  DBili  x   /  AST  174<H>  /  ALT  210<H>  /  AlkPhos  54  04-16        ABG - ( 15 Apr 2021 09:30 )  pH, Arterial: 7.47  pH, Blood: x     /  pCO2: 44    /  pO2: 79    / HCO3: 32    / Base Excess: 7.6   /  SaO2: 96                        RADIOLOGY:  < from: Xray Chest 1 View- PORTABLE-Routine (Xray Chest 1 View- PORTABLE-Routine in AM.) (04.16.21 @ 06:30) >    Impression:    Unchanged bilateral patchy airspace opacities.    < end of copied text >      PHYSICAL EXAM:    GENERAL: NAD, well-developed, AAOx3  HEENT:  Atraumatic, Normocephalic. EOMI, PERRLA, conjunctiva and sclera clear, No JVD  PULMONARY: decreased air entry b/l, no wheeze  CARDIOVASCULAR: Regular rate and rhythm; No murmurs, rubs, or gallops  GASTROINTESTINAL: Soft, Nontender, Nondistended; Bowel sounds present  MUSCULOSKELETAL:  2+ Peripheral Pulses, No clubbing, cyanosis, or edema  NEUROLOGY: non-focal  SKIN: No rashes or lesions

## 2021-04-16 NOTE — PROGRESS NOTE ADULT - SUBJECTIVE AND OBJECTIVE BOX
PAULSONLIZ NGUYEN  45y, Female  Allergy: No Known Allergies      LOS  4d    CHIEF COMPLAINT: shortness of breath (16 Apr 2021 07:49)      INTERVAL EVENTS/HPI  - HFNC  - T(F): , Max: 98.8 (04-15-21 @ 13:30)  - WBC Count: 6.26 (04-15-21 @ 06:24)  WBC Count: 7.65 (04-14-21 @ 08:03)     - Creatinine, Serum: 0.8 (04-15-21 @ 06:24)       COVID-19 [place] Domain AB Interp: Negative (04-13-21 @ 06:50)      ROS:  9-point ROS performed and negative except as per above    VITALS:  T(F): 97.5, Max: 98.8 (04-15-21 @ 13:30)  HR: 74  BP: 155/74  RR: 20Vital Signs Last 24 Hrs  T(C): 36.4 (16 Apr 2021 08:00), Max: 37.1 (15 Apr 2021 13:30)  T(F): 97.5 (16 Apr 2021 08:00), Max: 98.8 (15 Apr 2021 13:30)  HR: 74 (16 Apr 2021 08:18) (72 - 81)  BP: 155/74 (16 Apr 2021 08:00) (136/83 - 160/81)  BP(mean): 106 (16 Apr 2021 08:00) (106 - 106)  RR: 20 (16 Apr 2021 08:18) (18 - 93)  SpO2: 94% (16 Apr 2021 08:18) (90% - 98%)    FH: Non-contributory  Social Hx: Non-contributory    TESTS & MEASUREMENTS:                        13.6   6.26  )-----------( 234      ( 15 Apr 2021 06:24 )             42.4     04-15    142  |  101  |  22<H>  ----------------------------<  162<H>  3.9   |  28  |  0.8    Ca    8.9      15 Apr 2021 06:24  Mg     2.1     04-15    TPro  7.2  /  Alb  4.0  /  TBili  0.4  /  DBili  x   /  AST  104<H>  /  ALT  82<H>  /  AlkPhos  58  04-15      LIVER FUNCTIONS - ( 15 Apr 2021 06:24 )  Alb: 4.0 g/dL / Pro: 7.2 g/dL / ALK PHOS: 58 U/L / ALT: 82 U/L / AST: 104 U/L / GGT: x               Culture - Blood (collected 04-13-21 @ 11:53)  Source: .Blood None  Preliminary Report (04-14-21 @ 23:01):    No growth to date.        Lactate, Blood: 1.2 mmol/L (04-12-21 @ 21:55)  Lactate, Blood: 1.3 mmol/L (04-12-21 @ 15:00)      INFECTIOUS DISEASES TESTING  Procalcitonin, Serum: 0.06 (04-14-21 @ 11:53)  Procalcitonin, Serum: 0.11 (04-12-21 @ 15:00)  COVID-19 PCR: Detected (04-12-21 @ 14:33)      INFLAMMATORY MARKERS  C-Reactive Protein, Serum: 42 mg/L (04-14-21 @ 11:53)  C-Reactive Protein, Serum: 52 mg/L (04-12-21 @ 15:00)      RADIOLOGY & ADDITIONAL TESTS:  I have personally reviewed the last available Chest xray  CXR  Xray Chest 1 View- PORTABLE-Urgent:   EXAM:  XR CHEST PORTABLE URGENT 1V            PROCEDURE DATE:  04/14/2021            INTERPRETATION:  Clinical History / Reason for exam: Viral pneumonia.    Comparison : Chest radiograph prior day.    Technique/Positioning: Frontal portable, low lung volumes.    Findings:    Support devices: None.    Cardiac/mediastinum/hilum: Unchanged    Lung parenchyma/Pleura: Bilateral opacities    Skeleton/soft tissues: Unchanged    Impression:    Bilateral opacifications without difference.                  YUE THORNTON MD; Attending Interventional Radiologist  This document has been electronically signed. Apr 14 2021 12:10PM (04-14-21 @ 11:56)      CT      CARDIOLOGY TESTING  12 Lead ECG:   Ventricular Rate 81 BPM    Atrial Rate 81 BPM    P-R Interval 152 ms    QRS Duration 90 ms    Q-T Interval 376 ms    QTC Calculation(Bazett) 436 ms    P Axis 58 degrees    R Axis 8 degrees    T Axis 21 degrees    Diagnosis Line Normal sinus rhythm  Nonspecific T wave abnormality  Abnormal ECG    Confirmed by BETH ORTEGA, IMANI ([id number]) on 4/14/2021 9:27:11 AM (04-14-21 @ 07:59)  12 Lead ECG:   Ventricular Rate 104 BPM    Atrial Rate 104 BPM    P-R Interval 144 ms    QRS Duration 90 ms    Q-T Interval 344 ms    QTC Calculation(Bazett) 452 ms    P Axis 60 degrees    R Axis 116 degrees    T Axis 24 degrees    Diagnosis Line Sinus tachycardia  Right axis deviation  Cannot rule out Anterior infarct , age undetermined  Abnormal ECG    Confirmed by ZAHRAA NUNES MD ([id number]) on 4/12/2021 4:16:24 PM (04-12-21 @ 15:16)      MEDICATIONS  chlorhexidine 4% Liquid 1 Topical <User Schedule>  citalopram 10 Oral daily  dexAMETHasone  Injectable 6 IV Push daily  dextrose 40% Gel 15 Oral once  dextrose 5%. 1000 IV Continuous <Continuous>  dextrose 5%. 1000 IV Continuous <Continuous>  dextrose 50% Injectable 25 IV Push once  dextrose 50% Injectable 12.5 IV Push once  dextrose 50% Injectable 25 IV Push once  enoxaparin Injectable 40 SubCutaneous every 12 hours  glucagon  Injectable 1 IntraMuscular once  insulin glargine Injectable (LANTUS) 25 SubCutaneous at bedtime  insulin lispro (ADMELOG) corrective regimen sliding scale  SubCutaneous three times a day before meals  insulin lispro Injectable (ADMELOG) 8 SubCutaneous three times a day before meals  losartan 50 Oral daily  pantoprazole    Tablet 40 Oral before breakfast  remdesivir  IVPB  IV Intermittent   remdesivir  IVPB 100 IV Intermittent every 24 hours      WEIGHT  Weight (kg): 108.9 (04-12-21 @ 21:44)      ANTIBIOTICS:  remdesivir  IVPB   IV Intermittent   remdesivir  IVPB 100 milliGRAM(s) IV Intermittent every 24 hours      All available historical records have been reviewed

## 2021-04-17 LAB
ALBUMIN SERPL ELPH-MCNC: 3.6 G/DL — SIGNIFICANT CHANGE UP (ref 3.5–5.2)
ALP SERPL-CCNC: 53 U/L — SIGNIFICANT CHANGE UP (ref 30–115)
ALT FLD-CCNC: 178 U/L — HIGH (ref 0–41)
ANION GAP SERPL CALC-SCNC: 10 MMOL/L — SIGNIFICANT CHANGE UP (ref 7–14)
AST SERPL-CCNC: 123 U/L — HIGH (ref 0–41)
BASOPHILS # BLD AUTO: 0.01 K/UL — SIGNIFICANT CHANGE UP (ref 0–0.2)
BASOPHILS NFR BLD AUTO: 0.2 % — SIGNIFICANT CHANGE UP (ref 0–1)
BILIRUB SERPL-MCNC: 0.5 MG/DL — SIGNIFICANT CHANGE UP (ref 0.2–1.2)
BUN SERPL-MCNC: 20 MG/DL — SIGNIFICANT CHANGE UP (ref 10–20)
CALCIUM SERPL-MCNC: 8.6 MG/DL — SIGNIFICANT CHANGE UP (ref 8.5–10.1)
CHLORIDE SERPL-SCNC: 96 MMOL/L — LOW (ref 98–110)
CO2 SERPL-SCNC: 29 MMOL/L — SIGNIFICANT CHANGE UP (ref 17–32)
CREAT SERPL-MCNC: 0.6 MG/DL — LOW (ref 0.7–1.5)
CRP SERPL-MCNC: 7 MG/L — HIGH
EOSINOPHIL # BLD AUTO: 0 K/UL — SIGNIFICANT CHANGE UP (ref 0–0.7)
EOSINOPHIL NFR BLD AUTO: 0 % — SIGNIFICANT CHANGE UP (ref 0–8)
GLUCOSE BLDC GLUCOMTR-MCNC: 143 MG/DL — HIGH (ref 70–99)
GLUCOSE BLDC GLUCOMTR-MCNC: 184 MG/DL — HIGH (ref 70–99)
GLUCOSE BLDC GLUCOMTR-MCNC: 273 MG/DL — HIGH (ref 70–99)
GLUCOSE SERPL-MCNC: 189 MG/DL — HIGH (ref 70–99)
HCT VFR BLD CALC: 41.1 % — SIGNIFICANT CHANGE UP (ref 37–47)
HGB BLD-MCNC: 13.5 G/DL — SIGNIFICANT CHANGE UP (ref 12–16)
IMM GRANULOCYTES NFR BLD AUTO: 0.9 % — HIGH (ref 0.1–0.3)
LYMPHOCYTES # BLD AUTO: 1.66 K/UL — SIGNIFICANT CHANGE UP (ref 1.2–3.4)
LYMPHOCYTES # BLD AUTO: 36.2 % — SIGNIFICANT CHANGE UP (ref 20.5–51.1)
MAGNESIUM SERPL-MCNC: 2.1 MG/DL — SIGNIFICANT CHANGE UP (ref 1.8–2.4)
MCHC RBC-ENTMCNC: 28.4 PG — SIGNIFICANT CHANGE UP (ref 27–31)
MCHC RBC-ENTMCNC: 32.8 G/DL — SIGNIFICANT CHANGE UP (ref 32–37)
MCV RBC AUTO: 86.5 FL — SIGNIFICANT CHANGE UP (ref 81–99)
MONOCYTES # BLD AUTO: 0.37 K/UL — SIGNIFICANT CHANGE UP (ref 0.1–0.6)
MONOCYTES NFR BLD AUTO: 8.1 % — SIGNIFICANT CHANGE UP (ref 1.7–9.3)
NEUTROPHILS # BLD AUTO: 2.5 K/UL — SIGNIFICANT CHANGE UP (ref 1.4–6.5)
NEUTROPHILS NFR BLD AUTO: 54.6 % — SIGNIFICANT CHANGE UP (ref 42.2–75.2)
NRBC # BLD: 0 /100 WBCS — SIGNIFICANT CHANGE UP (ref 0–0)
PLATELET # BLD AUTO: 295 K/UL — SIGNIFICANT CHANGE UP (ref 130–400)
POTASSIUM SERPL-MCNC: 4 MMOL/L — SIGNIFICANT CHANGE UP (ref 3.5–5)
POTASSIUM SERPL-SCNC: 4 MMOL/L — SIGNIFICANT CHANGE UP (ref 3.5–5)
PROCALCITONIN SERPL-MCNC: 0.04 NG/ML — SIGNIFICANT CHANGE UP (ref 0.02–0.1)
PROT SERPL-MCNC: 6.5 G/DL — SIGNIFICANT CHANGE UP (ref 6–8)
RBC # BLD: 4.75 M/UL — SIGNIFICANT CHANGE UP (ref 4.2–5.4)
RBC # FLD: 12.8 % — SIGNIFICANT CHANGE UP (ref 11.5–14.5)
SODIUM SERPL-SCNC: 135 MMOL/L — SIGNIFICANT CHANGE UP (ref 135–146)
WBC # BLD: 4.58 K/UL — LOW (ref 4.8–10.8)
WBC # FLD AUTO: 4.58 K/UL — LOW (ref 4.8–10.8)

## 2021-04-17 PROCEDURE — 99233 SBSQ HOSP IP/OBS HIGH 50: CPT

## 2021-04-17 PROCEDURE — 99232 SBSQ HOSP IP/OBS MODERATE 35: CPT

## 2021-04-17 RX ADMIN — Medication 6 MILLIGRAM(S): at 05:53

## 2021-04-17 RX ADMIN — Medication 8 UNIT(S): at 18:24

## 2021-04-17 RX ADMIN — Medication 8 UNIT(S): at 11:59

## 2021-04-17 RX ADMIN — ENOXAPARIN SODIUM 40 MILLIGRAM(S): 100 INJECTION SUBCUTANEOUS at 05:53

## 2021-04-17 RX ADMIN — Medication 8 UNIT(S): at 08:40

## 2021-04-17 RX ADMIN — LOSARTAN POTASSIUM 50 MILLIGRAM(S): 100 TABLET, FILM COATED ORAL at 05:53

## 2021-04-17 RX ADMIN — Medication 3: at 11:59

## 2021-04-17 RX ADMIN — Medication 1: at 18:24

## 2021-04-17 RX ADMIN — CITALOPRAM 10 MILLIGRAM(S): 10 TABLET, FILM COATED ORAL at 11:58

## 2021-04-17 RX ADMIN — ENOXAPARIN SODIUM 40 MILLIGRAM(S): 100 INJECTION SUBCUTANEOUS at 18:25

## 2021-04-17 RX ADMIN — INSULIN GLARGINE 25 UNIT(S): 100 INJECTION, SOLUTION SUBCUTANEOUS at 21:28

## 2021-04-17 RX ADMIN — PANTOPRAZOLE SODIUM 40 MILLIGRAM(S): 20 TABLET, DELAYED RELEASE ORAL at 05:54

## 2021-04-17 NOTE — PROGRESS NOTE ADULT - SUBJECTIVE AND OBJECTIVE BOX
LIZ PAULSON  45y, Female  Allergy: No Known Allergies    Hospital Day: 5d    Patient seen and examined earlier today. No acute events overnight.    PMH/PSH:  PAST MEDICAL & SURGICAL HISTORY:  Hypertension    Diabetes mellitus    VITALS:  T(F): 97.5 (04-17-21 @ 08:53), Max: 98.1 (04-17-21 @ 00:46)  HR: 73 (04-17-21 @ 08:53)  BP: 136/63 (04-17-21 @ 08:53) (136/63 - 155/71)  RR: 20 (04-17-21 @ 08:53)  SpO2: 95% (04-17-21 @ 09:05)    TESTS & MEASUREMENTS:  Weight (Kg):   BMI (kg/m2): 40 (04-12)    04-15-21 @ 07:01  -  04-16-21 @ 07:00  --------------------------------------------------------  IN: 240 mL / OUT: 550 mL / NET: -310 mL                        12.9   4.75  )-----------( 256      ( 16 Apr 2021 07:56 )             39.5     04-16    140  |  100  |  21<H>  ----------------------------<  157<H>  4.2   |  27  |  0.5<L>    Ca    8.4<L>      16 Apr 2021 07:56  Mg     2.0     04-16    TPro  6.6  /  Alb  3.5  /  TBili  0.5  /  DBili  x   /  AST  174<H>  /  ALT  210<H>  /  AlkPhos  54  04-16    LIVER FUNCTIONS - ( 16 Apr 2021 07:56 )  Alb: 3.5 g/dL / Pro: 6.6 g/dL / ALK PHOS: 54 U/L / ALT: 210 U/L / AST: 174 U/L / GGT: x           Culture - Blood (collected 04-13-21 @ 11:53)  Source: .Blood None  Preliminary Report (04-14-21 @ 23:01):    No growth to date.    RECENT DIAGNOSTIC ORDERS:  C-Reactive Protein, Serum: AM Sched. Collection: 17-Apr-2021 04:30 (04-16-21 @ 15:25)  Magnesium, Serum: AM Sched. Collection: 17-Apr-2021 04:30 (04-16-21 @ 15:25)  Comprehensive Metabolic Panel: AM Sched. Collection: 17-Apr-2021 04:30 (04-16-21 @ 15:25)  Complete Blood Count + Automated Diff: AM Sched. Collection: 17-Apr-2021 04:30 (04-16-21 @ 15:25)    MEDICATIONS:  MEDICATIONS  (STANDING):  chlorhexidine 4% Liquid 1 Application(s) Topical <User Schedule>  citalopram 10 milliGRAM(s) Oral daily  dexAMETHasone  Injectable 6 milliGRAM(s) IV Push daily  dextrose 40% Gel 15 Gram(s) Oral once  dextrose 5%. 1000 milliLiter(s) (50 mL/Hr) IV Continuous <Continuous>  dextrose 5%. 1000 milliLiter(s) (100 mL/Hr) IV Continuous <Continuous>  dextrose 50% Injectable 25 Gram(s) IV Push once  dextrose 50% Injectable 12.5 Gram(s) IV Push once  dextrose 50% Injectable 25 Gram(s) IV Push once  enoxaparin Injectable 40 milliGRAM(s) SubCutaneous every 12 hours  glucagon  Injectable 1 milliGRAM(s) IntraMuscular once  insulin glargine Injectable (LANTUS) 25 Unit(s) SubCutaneous at bedtime  insulin lispro (ADMELOG) corrective regimen sliding scale   SubCutaneous three times a day before meals  insulin lispro Injectable (ADMELOG) 8 Unit(s) SubCutaneous three times a day before meals  losartan 50 milliGRAM(s) Oral daily  pantoprazole    Tablet 40 milliGRAM(s) Oral before breakfast    MEDICATIONS  (PRN):  acetaminophen   Tablet .. 650 milliGRAM(s) Oral every 6 hours PRN Temp greater or equal to 38C (100.4F), Moderate Pain (4 - 6)  guaifenesin/dextromethorphan  Syrup 10 milliLiter(s) Oral every 4 hours PRN Cough    HOME MEDICATIONS:  Albuterol (Eqv-Proventil HFA) 90 mcg/inh inhalation aerosol (04-12)  azithromycin 250 mg oral tablet (04-12)  citalopram 10 mg oral tablet (04-12)  losartan 50 mg oral tablet (04-12)  Promethazine DM 6.25 mg-15 mg/5 mL oral syrup (04-12)    REVIEW OF SYSTEMS:  All other review of systems is negative unless indicated above.     PHYSICAL EXAM:  GENERAL: NAD  HEENT: No Swelling  CHEST/LUNG: Good air entry, No wheezing  HEART: RRR, No murmurs  ABDOMEN: Soft, Bowel sounds present  EXTREMITIES:  No clubbing

## 2021-04-17 NOTE — PROGRESS NOTE ADULT - SUBJECTIVE AND OBJECTIVE BOX
Patient is a 45y old  Female who presents with a chief complaint of shortness of breath (16 Apr 2021 16:42)        Over Night Events:  Feels and looks better.  On HFNCO2 60 liters 80% O2.          ROS:     All ROS are negative except HPI         PHYSICAL EXAM    ICU Vital Signs Last 24 Hrs  T(C): 36.4 (17 Apr 2021 05:16), Max: 36.7 (17 Apr 2021 00:46)  T(F): 97.6 (17 Apr 2021 05:16), Max: 98.1 (17 Apr 2021 00:46)  HR: 74 (17 Apr 2021 05:16) (69 - 96)  BP: 139/73 (17 Apr 2021 05:16) (137/64 - 155/74)  BP(mean): 106 (16 Apr 2021 16:02) (92 - 106)  ABP: --  ABP(mean): --  RR: 22 (16 Apr 2021 16:24) (20 - 35)  SpO2: 98% (17 Apr 2021 05:16) (94% - 98%)      CONSTITUTIONAL:  Well nourished.  NAD    ENT:   Airway patent,   Mouth with normal mucosa.   No thrush    EYES:   Pupils equal,   Round and reactive to light.    CARDIAC:   Normal rate,   Regular rhythm.    No edema      Vascular:  Normal systolic impulse  No Carotid bruits    RESPIRATORY:   No wheezing  Bilateral crackles   Normal chest expansion  Not tachypneic,  No use of accessory muscles    GASTROINTESTINAL:  Abdomen soft,   Non-tender,   No guarding,   + BS    MUSCULOSKELETAL:   Range of motion is not limited,  No clubbing, cyanosis    NEUROLOGICAL:   Alert and oriented   No motor  deficits.    SKIN:   Skin normal color for race,   Warm and dry and intact.   No evidence of rash.    PSYCHIATRIC:   Normal mood and affect.   No apparent risk to self or others.    HEMATOLOGICAL:  No cervical  lymphadenopathy.  no inguinal lymphadenopathy      04-15-21 @ 07:01  -  04-16-21 @ 07:00  --------------------------------------------------------  IN:    Oral Fluid: 240 mL  Total IN: 240 mL    OUT:    Voided (mL): 550 mL  Total OUT: 550 mL    Total NET: -310 mL          LABS:                            12.9   4.75  )-----------( 256      ( 16 Apr 2021 07:56 )             39.5                                               04-16    140  |  100  |  21<H>  ----------------------------<  157<H>  4.2   |  27  |  0.5<L>    Ca    8.4<L>      16 Apr 2021 07:56  Mg     2.0     04-16    TPro  6.6  /  Alb  3.5  /  TBili  0.5  /  DBili  x   /  AST  174<H>  /  ALT  210<H>  /  AlkPhos  54  04-16                                                                                           LIVER FUNCTIONS - ( 16 Apr 2021 07:56 )  Alb: 3.5 g/dL / Pro: 6.6 g/dL / ALK PHOS: 54 U/L / ALT: 210 U/L / AST: 174 U/L / GGT: x                                                                                                           MEDICATIONS  (STANDING):  chlorhexidine 4% Liquid 1 Application(s) Topical <User Schedule>  citalopram 10 milliGRAM(s) Oral daily  dexAMETHasone  Injectable 6 milliGRAM(s) IV Push daily  dextrose 40% Gel 15 Gram(s) Oral once  dextrose 5%. 1000 milliLiter(s) (50 mL/Hr) IV Continuous <Continuous>  dextrose 5%. 1000 milliLiter(s) (100 mL/Hr) IV Continuous <Continuous>  dextrose 50% Injectable 25 Gram(s) IV Push once  dextrose 50% Injectable 12.5 Gram(s) IV Push once  dextrose 50% Injectable 25 Gram(s) IV Push once  enoxaparin Injectable 40 milliGRAM(s) SubCutaneous every 12 hours  glucagon  Injectable 1 milliGRAM(s) IntraMuscular once  insulin glargine Injectable (LANTUS) 25 Unit(s) SubCutaneous at bedtime  insulin lispro (ADMELOG) corrective regimen sliding scale   SubCutaneous three times a day before meals  insulin lispro Injectable (ADMELOG) 8 Unit(s) SubCutaneous three times a day before meals  losartan 50 milliGRAM(s) Oral daily  pantoprazole    Tablet 40 milliGRAM(s) Oral before breakfast    MEDICATIONS  (PRN):  acetaminophen   Tablet .. 650 milliGRAM(s) Oral every 6 hours PRN Temp greater or equal to 38C (100.4F), Moderate Pain (4 - 6)  guaifenesin/dextromethorphan  Syrup 10 milliLiter(s) Oral every 4 hours PRN Cough      New X-rays reviewed:                                                                                  ECHO    CXR interpreted by me:  Bilateral infiltrates

## 2021-04-18 LAB
CULTURE RESULTS: SIGNIFICANT CHANGE UP
GLUCOSE BLDC GLUCOMTR-MCNC: 172 MG/DL — HIGH (ref 70–99)
GLUCOSE BLDC GLUCOMTR-MCNC: 184 MG/DL — HIGH (ref 70–99)
GLUCOSE BLDC GLUCOMTR-MCNC: 212 MG/DL — HIGH (ref 70–99)
GLUCOSE BLDC GLUCOMTR-MCNC: 317 MG/DL — HIGH (ref 70–99)
SPECIMEN SOURCE: SIGNIFICANT CHANGE UP

## 2021-04-18 PROCEDURE — 99232 SBSQ HOSP IP/OBS MODERATE 35: CPT

## 2021-04-18 PROCEDURE — 71045 X-RAY EXAM CHEST 1 VIEW: CPT | Mod: 26

## 2021-04-18 RX ADMIN — ENOXAPARIN SODIUM 40 MILLIGRAM(S): 100 INJECTION SUBCUTANEOUS at 05:39

## 2021-04-18 RX ADMIN — Medication 1: at 07:51

## 2021-04-18 RX ADMIN — CITALOPRAM 10 MILLIGRAM(S): 10 TABLET, FILM COATED ORAL at 11:37

## 2021-04-18 RX ADMIN — Medication 6 MILLIGRAM(S): at 05:41

## 2021-04-18 RX ADMIN — Medication 8 UNIT(S): at 07:51

## 2021-04-18 RX ADMIN — Medication 8 UNIT(S): at 13:05

## 2021-04-18 RX ADMIN — LOSARTAN POTASSIUM 50 MILLIGRAM(S): 100 TABLET, FILM COATED ORAL at 05:39

## 2021-04-18 RX ADMIN — Medication 4: at 13:05

## 2021-04-18 RX ADMIN — Medication 1: at 16:33

## 2021-04-18 RX ADMIN — PANTOPRAZOLE SODIUM 40 MILLIGRAM(S): 20 TABLET, DELAYED RELEASE ORAL at 08:25

## 2021-04-18 RX ADMIN — Medication 8 UNIT(S): at 16:32

## 2021-04-18 RX ADMIN — INSULIN GLARGINE 25 UNIT(S): 100 INJECTION, SOLUTION SUBCUTANEOUS at 21:41

## 2021-04-18 NOTE — PROGRESS NOTE ADULT - SUBJECTIVE AND OBJECTIVE BOX
LIZ PAULSON  45y, Female  Allergy: No Known Allergies    Hospital Day: 6d    Patient seen and examined earlier today. No acute events overnight.    PMH/PSH:  PAST MEDICAL & SURGICAL HISTORY:  Hypertension    Diabetes mellitus    VITALS:  T(F): 97.8 (04-18-21 @ 07:45), Max: 98 (04-17-21 @ 21:42)  HR: 73 (04-18-21 @ 07:45)  BP: 125/68 (04-18-21 @ 07:45) (112/57 - 142/79)  RR: 20 (04-18-21 @ 08:00)  SpO2: 95% (04-18-21 @ 09:00)    TESTS & MEASUREMENTS:  Weight (Kg):     04-17-21 @ 07:01  -  04-18-21 @ 07:00  --------------------------------------------------------  IN: 0 mL / OUT: 401 mL / NET: -401 mL                        13.5   4.58  )-----------( 295      ( 17 Apr 2021 08:51 )             41.1     04-17    135  |  96<L>  |  20  ----------------------------<  189<H>  4.0   |  29  |  0.6<L>    Ca    8.6      17 Apr 2021 08:51  Mg     2.1     04-17    TPro  6.5  /  Alb  3.6  /  TBili  0.5  /  DBili  x   /  AST  123<H>  /  ALT  178<H>  /  AlkPhos  53  04-17    LIVER FUNCTIONS - ( 17 Apr 2021 08:51 )  Alb: 3.6 g/dL / Pro: 6.5 g/dL / ALK PHOS: 53 U/L / ALT: 178 U/L / AST: 123 U/L / GGT: x           Culture - Blood (collected 04-13-21 @ 11:53)  Source: .Blood None  Preliminary Report (04-14-21 @ 23:01):    No growth to date.    RECENT DIAGNOSTIC ORDERS:  Comprehensive Metabolic Panel: AM Sched. Collection: 19-Apr-2021 04:30 (04-18-21 @ 08:12)  Complete Blood Count + Automated Diff: AM Sched. Collection: 19-Apr-2021 04:30 (04-18-21 @ 08:12)    MEDICATIONS:  MEDICATIONS  (STANDING):  chlorhexidine 4% Liquid 1 Application(s) Topical <User Schedule>  citalopram 10 milliGRAM(s) Oral daily  dexAMETHasone  Injectable 6 milliGRAM(s) IV Push daily  dextrose 40% Gel 15 Gram(s) Oral once  dextrose 5%. 1000 milliLiter(s) (50 mL/Hr) IV Continuous <Continuous>  dextrose 5%. 1000 milliLiter(s) (100 mL/Hr) IV Continuous <Continuous>  dextrose 50% Injectable 25 Gram(s) IV Push once  dextrose 50% Injectable 12.5 Gram(s) IV Push once  dextrose 50% Injectable 25 Gram(s) IV Push once  enoxaparin Injectable 40 milliGRAM(s) SubCutaneous every 12 hours  glucagon  Injectable 1 milliGRAM(s) IntraMuscular once  insulin glargine Injectable (LANTUS) 25 Unit(s) SubCutaneous at bedtime  insulin lispro (ADMELOG) corrective regimen sliding scale   SubCutaneous three times a day before meals  insulin lispro Injectable (ADMELOG) 8 Unit(s) SubCutaneous three times a day before meals  losartan 50 milliGRAM(s) Oral daily  pantoprazole    Tablet 40 milliGRAM(s) Oral before breakfast    MEDICATIONS  (PRN):  acetaminophen   Tablet .. 650 milliGRAM(s) Oral every 6 hours PRN Temp greater or equal to 38C (100.4F), Moderate Pain (4 - 6)  guaifenesin/dextromethorphan  Syrup 10 milliLiter(s) Oral every 4 hours PRN Cough    HOME MEDICATIONS:  Albuterol (Eqv-Proventil HFA) 90 mcg/inh inhalation aerosol (04-12)  azithromycin 250 mg oral tablet (04-12)  citalopram 10 mg oral tablet (04-12)  losartan 50 mg oral tablet (04-12)  Promethazine DM 6.25 mg-15 mg/5 mL oral syrup (04-12)    REVIEW OF SYSTEMS:  All other review of systems is negative unless indicated above.     PHYSICAL EXAM:  GENERAL: NAD  HEENT: No Swelling  CHEST/LUNG: Good air entry, No wheezing  HEART: RRR, No murmurs  ABDOMEN: Soft, Bowel sounds present  EXTREMITIES:  No clubbing

## 2021-04-18 NOTE — PROGRESS NOTE ADULT - SUBJECTIVE AND OBJECTIVE BOX
OVERNIGHT EVENTS: events noted, still on [facility]    Vital Signs Last 24 Hrs  T(C): 36.6 (18 Apr 2021 07:45), Max: 36.7 (17 Apr 2021 21:42)  T(F): 97.8 (18 Apr 2021 07:45), Max: 98 (17 Apr 2021 21:42)  HR: 73 (18 Apr 2021 07:45) (73 - 86)  BP: 125/68 (18 Apr 2021 07:45) (112/57 - 142/79)  BP(mean): --  RR: 20 (18 Apr 2021 08:00) (18 - 21)  SpO2: 95% (18 Apr 2021 09:00) (94% - 97%)    PHYSICAL EXAMINATION:    GENERAL: Ill looking    HEENT: Head is normocephalic and atraumatic.     NECK: Supple.    LUNGS: bl crackles    HEART: Regular rate and rhythm without murmur.    ABDOMEN: Soft, nontender, and nondistended.      EXTREMITIES: Without any cyanosis, clubbing, rash, lesions or edema.    NEUROLOGIC: Grossly intact.    SKIN: No ulceration or induration present.      LABS:                        13.5   4.58  )-----------( 295      ( 17 Apr 2021 08:51 )             41.1     04-17    135  |  96<L>  |  20  ----------------------------<  189<H>  4.0   |  29  |  0.6<L>    Ca    8.6      17 Apr 2021 08:51  Mg     2.1     04-17    TPro  6.5  /  Alb  3.6  /  TBili  0.5  /  DBili  x   /  AST  123<H>  /  ALT  178<H>  /  AlkPhos  53  04-17                    Procalcitonin, Serum: 0.04 ng/mL (04-16-21 @ 07:56)        04-17-21 @ 07:01  -  04-18-21 @ 07:00  --------------------------------------------------------  IN: 0 mL / OUT: 401 mL / NET: -401 mL        MICROBIOLOGY:      MEDICATIONS  (STANDING):  chlorhexidine 4% Liquid 1 Application(s) Topical <User Schedule>  citalopram 10 milliGRAM(s) Oral daily  dexAMETHasone  Injectable 6 milliGRAM(s) IV Push daily  dextrose 40% Gel 15 Gram(s) Oral once  dextrose 5%. 1000 milliLiter(s) (50 mL/Hr) IV Continuous <Continuous>  dextrose 5%. 1000 milliLiter(s) (100 mL/Hr) IV Continuous <Continuous>  dextrose 50% Injectable 25 Gram(s) IV Push once  dextrose 50% Injectable 12.5 Gram(s) IV Push once  dextrose 50% Injectable 25 Gram(s) IV Push once  enoxaparin Injectable 40 milliGRAM(s) SubCutaneous every 12 hours  glucagon  Injectable 1 milliGRAM(s) IntraMuscular once  insulin glargine Injectable (LANTUS) 25 Unit(s) SubCutaneous at bedtime  insulin lispro (ADMELOG) corrective regimen sliding scale   SubCutaneous three times a day before meals  insulin lispro Injectable (ADMELOG) 8 Unit(s) SubCutaneous three times a day before meals  losartan 50 milliGRAM(s) Oral daily  pantoprazole    Tablet 40 milliGRAM(s) Oral before breakfast    MEDICATIONS  (PRN):  acetaminophen   Tablet .. 650 milliGRAM(s) Oral every 6 hours PRN Temp greater or equal to 38C (100.4F), Moderate Pain (4 - 6)  guaifenesin/dextromethorphan  Syrup 10 milliLiter(s) Oral every 4 hours PRN Cough      RADIOLOGY & ADDITIONAL STUDIES:

## 2021-04-19 LAB
ALBUMIN SERPL ELPH-MCNC: 3.3 G/DL — LOW (ref 3.5–5.2)
ALP SERPL-CCNC: 47 U/L — SIGNIFICANT CHANGE UP (ref 30–115)
ALT FLD-CCNC: 153 U/L — HIGH (ref 0–41)
ANION GAP SERPL CALC-SCNC: 11 MMOL/L — SIGNIFICANT CHANGE UP (ref 7–14)
AST SERPL-CCNC: 73 U/L — HIGH (ref 0–41)
BASOPHILS # BLD AUTO: 0.01 K/UL — SIGNIFICANT CHANGE UP (ref 0–0.2)
BASOPHILS NFR BLD AUTO: 0.1 % — SIGNIFICANT CHANGE UP (ref 0–1)
BILIRUB SERPL-MCNC: 0.5 MG/DL — SIGNIFICANT CHANGE UP (ref 0.2–1.2)
BUN SERPL-MCNC: 19 MG/DL — SIGNIFICANT CHANGE UP (ref 10–20)
CALCIUM SERPL-MCNC: 8.8 MG/DL — SIGNIFICANT CHANGE UP (ref 8.5–10.1)
CHLORIDE SERPL-SCNC: 99 MMOL/L — SIGNIFICANT CHANGE UP (ref 98–110)
CO2 SERPL-SCNC: 30 MMOL/L — SIGNIFICANT CHANGE UP (ref 17–32)
CREAT SERPL-MCNC: 0.7 MG/DL — SIGNIFICANT CHANGE UP (ref 0.7–1.5)
EOSINOPHIL # BLD AUTO: 0.07 K/UL — SIGNIFICANT CHANGE UP (ref 0–0.7)
EOSINOPHIL NFR BLD AUTO: 0.8 % — SIGNIFICANT CHANGE UP (ref 0–8)
GLUCOSE BLDC GLUCOMTR-MCNC: 125 MG/DL — HIGH (ref 70–99)
GLUCOSE BLDC GLUCOMTR-MCNC: 185 MG/DL — HIGH (ref 70–99)
GLUCOSE BLDC GLUCOMTR-MCNC: 192 MG/DL — HIGH (ref 70–99)
GLUCOSE BLDC GLUCOMTR-MCNC: 251 MG/DL — HIGH (ref 70–99)
GLUCOSE SERPL-MCNC: 128 MG/DL — HIGH (ref 70–99)
HCT VFR BLD CALC: 39.9 % — SIGNIFICANT CHANGE UP (ref 37–47)
HGB BLD-MCNC: 13 G/DL — SIGNIFICANT CHANGE UP (ref 12–16)
IMM GRANULOCYTES NFR BLD AUTO: 0.7 % — HIGH (ref 0.1–0.3)
LYMPHOCYTES # BLD AUTO: 3.68 K/UL — HIGH (ref 1.2–3.4)
LYMPHOCYTES # BLD AUTO: 44.6 % — SIGNIFICANT CHANGE UP (ref 20.5–51.1)
MCHC RBC-ENTMCNC: 28.3 PG — SIGNIFICANT CHANGE UP (ref 27–31)
MCHC RBC-ENTMCNC: 32.6 G/DL — SIGNIFICANT CHANGE UP (ref 32–37)
MCV RBC AUTO: 86.7 FL — SIGNIFICANT CHANGE UP (ref 81–99)
MONOCYTES # BLD AUTO: 0.81 K/UL — HIGH (ref 0.1–0.6)
MONOCYTES NFR BLD AUTO: 9.8 % — HIGH (ref 1.7–9.3)
NEUTROPHILS # BLD AUTO: 3.63 K/UL — SIGNIFICANT CHANGE UP (ref 1.4–6.5)
NEUTROPHILS NFR BLD AUTO: 44 % — SIGNIFICANT CHANGE UP (ref 42.2–75.2)
NRBC # BLD: 0 /100 WBCS — SIGNIFICANT CHANGE UP (ref 0–0)
PLATELET # BLD AUTO: 337 K/UL — SIGNIFICANT CHANGE UP (ref 130–400)
POTASSIUM SERPL-MCNC: 4.2 MMOL/L — SIGNIFICANT CHANGE UP (ref 3.5–5)
POTASSIUM SERPL-SCNC: 4.2 MMOL/L — SIGNIFICANT CHANGE UP (ref 3.5–5)
PROT SERPL-MCNC: 6.1 G/DL — SIGNIFICANT CHANGE UP (ref 6–8)
RBC # BLD: 4.6 M/UL — SIGNIFICANT CHANGE UP (ref 4.2–5.4)
RBC # FLD: 12.9 % — SIGNIFICANT CHANGE UP (ref 11.5–14.5)
SODIUM SERPL-SCNC: 140 MMOL/L — SIGNIFICANT CHANGE UP (ref 135–146)
WBC # BLD: 8.26 K/UL — SIGNIFICANT CHANGE UP (ref 4.8–10.8)
WBC # FLD AUTO: 8.26 K/UL — SIGNIFICANT CHANGE UP (ref 4.8–10.8)

## 2021-04-19 PROCEDURE — 99232 SBSQ HOSP IP/OBS MODERATE 35: CPT

## 2021-04-19 RX ADMIN — Medication 6 MILLIGRAM(S): at 06:26

## 2021-04-19 RX ADMIN — LOSARTAN POTASSIUM 50 MILLIGRAM(S): 100 TABLET, FILM COATED ORAL at 06:26

## 2021-04-19 RX ADMIN — PANTOPRAZOLE SODIUM 40 MILLIGRAM(S): 20 TABLET, DELAYED RELEASE ORAL at 06:26

## 2021-04-19 RX ADMIN — Medication 8 UNIT(S): at 12:03

## 2021-04-19 RX ADMIN — ENOXAPARIN SODIUM 40 MILLIGRAM(S): 100 INJECTION SUBCUTANEOUS at 17:04

## 2021-04-19 RX ADMIN — Medication 3: at 12:04

## 2021-04-19 RX ADMIN — ENOXAPARIN SODIUM 40 MILLIGRAM(S): 100 INJECTION SUBCUTANEOUS at 06:26

## 2021-04-19 RX ADMIN — CITALOPRAM 10 MILLIGRAM(S): 10 TABLET, FILM COATED ORAL at 11:20

## 2021-04-19 RX ADMIN — INSULIN GLARGINE 25 UNIT(S): 100 INJECTION, SOLUTION SUBCUTANEOUS at 22:14

## 2021-04-19 RX ADMIN — Medication 8 UNIT(S): at 16:50

## 2021-04-19 RX ADMIN — Medication 8 UNIT(S): at 08:00

## 2021-04-19 RX ADMIN — Medication 1: at 16:50

## 2021-04-19 NOTE — DIETITIAN INITIAL EVALUATION ADULT. - PERTINENT LABORATORY DATA
(4/19) Glucose: 129, AST: 73, ALT: 153  HgbA1c: 7.1 (4/13)     CAPILLARY BLOOD GLUCOSE  POCT Blood Glucose.: 125 mg/dL (19 Apr 2021 07:38)  POCT Blood Glucose.: 212 mg/dL (18 Apr 2021 20:31)  POCT Blood Glucose.: 172 mg/dL (18 Apr 2021 16:29)  POCT Blood Glucose.: 317 mg/dL (18 Apr 2021 11:53)

## 2021-04-19 NOTE — DIETITIAN INITIAL EVALUATION ADULT. - PHYSCIAL ASSESSMENT
Cognition: WDL; no edema noted; GI: no discomfort reported, LBM 4/18 per pt; Skin: WDL per RN flowsheets; wts in EMR -- relatively stable

## 2021-04-19 NOTE — DIETITIAN INITIAL EVALUATION ADULT. - PERTINENT MEDS FT
MEDICATIONS  (STANDING):  dexAMETHasone  Injectable 6 milliGRAM(s) IV Push daily  dextrose 40% Gel 15 Gram(s) Oral once  dextrose 5%. 1000 milliLiter(s) (50 mL/Hr) IV Continuous <Continuous>  dextrose 50% Injectable 25 Gram(s) IV Push once  enoxaparin Injectable 40 milliGRAM(s) SubCutaneous every 12 hours  glucagon  Injectable 1 milliGRAM(s) IntraMuscular once  insulin glargine Injectable (LANTUS) 25 Unit(s) SubCutaneous at bedtime  insulin lispro Injectable (ADMELOG) 8 Unit(s) SubCutaneous three times a day before meals  losartan 50 milliGRAM(s) Oral daily  pantoprazole    Tablet 40 milliGRAM(s) Oral before breakfast

## 2021-04-19 NOTE — DIETITIAN INITIAL EVALUATION ADULT. - NAME AND PHONE
Nutrition Intervention:meals and snacks; Nutrition Monitoring:diet order,energy intake,body composition,NFPF, glucose profile

## 2021-04-19 NOTE — PROGRESS NOTE ADULT - SUBJECTIVE AND OBJECTIVE BOX
Patient is a 45y old  Female who presents with a chief complaint of shortness of breath (18 Apr 2021 11:18)        Over Night Events:  Feels better.  On O2 NC.  Off pressors         ROS:     All ROS are negative except HPI         PHYSICAL EXAM    ICU Vital Signs Last 24 Hrs  T(C): 36.1 (19 Apr 2021 06:15), Max: 36.6 (18 Apr 2021 07:45)  T(F): 97 (19 Apr 2021 06:15), Max: 97.9 (18 Apr 2021 21:27)  HR: 62 (19 Apr 2021 06:15) (62 - 82)  BP: 103/56 (19 Apr 2021 06:15) (103/56 - 135/76)  BP(mean): --  ABP: --  ABP(mean): --  RR: 18 (19 Apr 2021 06:15) (18 - 20)  SpO2: 96% (19 Apr 2021 06:15) (94% - 97%)      CONSTITUTIONAL:  Well nourished.  NAD    ENT:   Airway patent,   Mouth with normal mucosa.   No thrush    EYES:   Pupils equal,   Round and reactive to light.    CARDIAC:   Normal rate,   Regular rhythm.    No edema      Vascular:  Normal systolic impulse  No Carotid bruits    RESPIRATORY:   No wheezing  Bilateral crackles   Normal chest expansion  Not tachypneic,  No use of accessory muscles    GASTROINTESTINAL:  Abdomen soft,   Non-tender,   No guarding,   + BS    MUSCULOSKELETAL:   Range of motion is not limited,  No clubbing, cyanosis    NEUROLOGICAL:   Alert and oriented   No motor  deficits.    SKIN:   Skin normal color for race,   Warm and dry    No evidence of rash.    PSYCHIATRIC:   Normal mood and affect.   No apparent risk to self or others.    HEMATOLOGICAL:  No cervical  lymphadenopathy.  no inguinal lymphadenopathy      04-17-21 @ 07:01  -  04-18-21 @ 07:00  --------------------------------------------------------  IN:  Total IN: 0 mL    OUT:    Voided (mL): 401 mL  Total OUT: 401 mL    Total NET: -401 mL      04-18-21 @ 07:01  -  04-19-21 @ 06:38  --------------------------------------------------------  IN:  Total IN: 0 mL    OUT:    Voided (mL): 600 mL  Total OUT: 600 mL    Total NET: -600 mL          LABS:                            13.5   4.58  )-----------( 295      ( 17 Apr 2021 08:51 )             41.1                                               04-17    135  |  96<L>  |  20  ----------------------------<  189<H>  4.0   |  29  |  0.6<L>    Ca    8.6      17 Apr 2021 08:51  Mg     2.1     04-17    TPro  6.5  /  Alb  3.6  /  TBili  0.5  /  DBili  x   /  AST  123<H>  /  ALT  178<H>  /  AlkPhos  53  04-17                                                                                           LIVER FUNCTIONS - ( 17 Apr 2021 08:51 )  Alb: 3.6 g/dL / Pro: 6.5 g/dL / ALK PHOS: 53 U/L / ALT: 178 U/L / AST: 123 U/L / GGT: x                                                                                                                                       MEDICATIONS  (STANDING):  chlorhexidine 4% Liquid 1 Application(s) Topical <User Schedule>  citalopram 10 milliGRAM(s) Oral daily  dexAMETHasone  Injectable 6 milliGRAM(s) IV Push daily  dextrose 40% Gel 15 Gram(s) Oral once  dextrose 5%. 1000 milliLiter(s) (50 mL/Hr) IV Continuous <Continuous>  dextrose 5%. 1000 milliLiter(s) (100 mL/Hr) IV Continuous <Continuous>  dextrose 50% Injectable 25 Gram(s) IV Push once  dextrose 50% Injectable 12.5 Gram(s) IV Push once  dextrose 50% Injectable 25 Gram(s) IV Push once  enoxaparin Injectable 40 milliGRAM(s) SubCutaneous every 12 hours  glucagon  Injectable 1 milliGRAM(s) IntraMuscular once  insulin glargine Injectable (LANTUS) 25 Unit(s) SubCutaneous at bedtime  insulin lispro (ADMELOG) corrective regimen sliding scale   SubCutaneous three times a day before meals  insulin lispro Injectable (ADMELOG) 8 Unit(s) SubCutaneous three times a day before meals  losartan 50 milliGRAM(s) Oral daily  pantoprazole    Tablet 40 milliGRAM(s) Oral before breakfast    MEDICATIONS  (PRN):  acetaminophen   Tablet .. 650 milliGRAM(s) Oral every 6 hours PRN Temp greater or equal to 38C (100.4F), Moderate Pain (4 - 6)  guaifenesin/dextromethorphan  Syrup 10 milliLiter(s) Oral every 4 hours PRN Cough      New X-rays reviewed:                                                                                  ECHO    CXR interpreted by me:  Improving infiltrates

## 2021-04-19 NOTE — DIETITIAN INITIAL EVALUATION ADULT. - ORAL INTAKE PTA/DIET HISTORY
spoke w/ pt via personal phone (#[phone]) -- reported PTA po/appetite for a few days wasn't great d/t covid s/s, says it was on/off. UBW: 109kg vs. wt at admit: 109kg -- wt stable. pt does not report wt loss. NKFA. takes folic acid, vit C and fish oil. no cul/rel or personal food rest/prefs.

## 2021-04-19 NOTE — DIETITIAN INITIAL EVALUATION ADULT. - EDUCATION DIETARY MODIFICATIONS
encouraged po intake; attempted to provide written education regarding DM -- however pt refused/(1) partially meets; needs review/practice/verbalization

## 2021-04-19 NOTE — DIETITIAN INITIAL EVALUATION ADULT. - OTHER INFO
pertinent medical information:   #Acute hypoxemic respiratory failure improving   #Severe COVID pneumonia SP TOCI and Plasma   - Wean down oxygen requirements as tolerated  #h/o HTN and DM noted     pertinent subjective information: reports po/appetite >75%. n chewing/swallowing difficulty reported.

## 2021-04-19 NOTE — PROGRESS NOTE ADULT - SUBJECTIVE AND OBJECTIVE BOX
LIZ PAULSON  45y, Female  Allergy: No Known Allergies    Hospital Day: 7d    Patient seen and examined earlier today. No acute events overnight.    PMH/PSH:  PAST MEDICAL & SURGICAL HISTORY:  Hypertension    Diabetes mellitus    VITALS:  T(F): 97.3 (04-19-21 @ 13:07), Max: 98 (04-19-21 @ 10:43)  HR: 72 (04-19-21 @ 13:07)  BP: 140/69 (04-19-21 @ 13:07) (103/56 - 140/69)  RR: 18 (04-19-21 @ 13:07)  SpO2: 97% (04-19-21 @ 13:07)    TESTS & MEASUREMENTS:  Weight (Kg): 109 (04-19-21 @ 10:43)  BMI (kg/m2): 40 (04-19)    04-17-21 @ 07:01  -  04-18-21 @ 07:00  --------------------------------------------------------  IN: 0 mL / OUT: 401 mL / NET: -401 mL    04-18-21 @ 07:01  -  04-19-21 @ 07:00  --------------------------------------------------------  IN: 0 mL / OUT: 600 mL / NET: -600 mL                        13.0   8.26  )-----------( 337      ( 19 Apr 2021 06:35 )             39.9     04-19    140  |  99  |  19  ----------------------------<  128<H>  4.2   |  30  |  0.7    Ca    8.8      19 Apr 2021 06:35    TPro  6.1  /  Alb  3.3<L>  /  TBili  0.5  /  DBili  x   /  AST  73<H>  /  ALT  153<H>  /  AlkPhos  47  04-19    LIVER FUNCTIONS - ( 19 Apr 2021 06:35 )  Alb: 3.3 g/dL / Pro: 6.1 g/dL / ALK PHOS: 47 U/L / ALT: 153 U/L / AST: 73 U/L / GGT: x           Culture - Blood (collected 04-13-21 @ 11:53)  Source: .Blood None  Final Report (04-18-21 @ 23:00):    No Growth Final    RECENT DIAGNOSTIC ORDERS:  Procalcitonin, Serum: AM Sched. Collection: 21-Apr-2021 04:30 (04-19-21 @ 08:44)  Procalcitonin, Serum: Repeat From: 19-Apr-2021 08:43 To: 01-May-2021 04:30, Every 2 day(s) (04-19-21 @ 08:44)  C-Reactive Protein, Serum: AM Sched. Collection: 20-Apr-2021 04:30 (04-19-21 @ 08:44)  C-Reactive Protein, Serum: Repeat From: 19-Apr-2021 08:43 To: 29-Apr-2021 00:00, Every 2 day(s) (04-19-21 @ 08:44)  Ferritin, Serum: AM Sched. Collection: 21-Apr-2021 04:30 (04-19-21 @ 08:44)  Ferritin, Serum: Repeat From: 19-Apr-2021 08:43 To: 01-May-2021 04:30, Every 2 day(s) (04-19-21 @ 08:44)  Complete Blood Count + Automated Diff: AM Sched. Collection: 20-Apr-2021 04:30 (04-19-21 @ 08:44)  Comprehensive Metabolic Panel: AM Sched. Collection: 20-Apr-2021 04:30 (04-19-21 @ 08:44)  D-Dimer Assay, Quantitative:  Start Date:21-Apr-2021. AM Sched. Collection:21-Apr-2021 04:30 (04-19-21 @ 08:44)  Magnesium, Serum: AM Sched. Collection: 20-Apr-2021 04:30 (04-19-21 @ 08:44)  D-Dimer Assay, Quantitative: Repeat From: 19-Apr-2021 08:42 To: 01-May-2021 04:30, Every 2 day(s) Start Date:19-Apr-2021 (04-19-21 @ 08:44)  Magnesium, Serum: Repeat From: 19-Apr-2021 08:42 To: 10-May-2021 04:30, Every 1 day(s) (04-19-21 @ 08:44)  Comprehensive Metabolic Panel: Repeat From: 19-Apr-2021 08:42 To: 01-May-2021 04:30, Every 1 day(s) (04-19-21 @ 08:44)  Complete Blood Count + Automated Diff: Repeat From: 19-Apr-2021 08:43 To: 10-May-2021 04:30, Every 1 day(s) (04-19-21 @ 08:44)  Xray Chest 1 View- PORTABLE-Routine: AM   Indication: SOB  Transport: Portable (04-19-21 @ 08:33)  D-Dimer Assay, Quantitative:  Start Date:19-Apr-2021. AM Sched. Collection:20-Apr-2021 04:30 (04-19-21 @ 08:24)  Sedimentation Rate, Erythrocyte: AM Sched. Collection: 20-Apr-2021 04:30 (04-19-21 @ 08:24)  Procalcitonin, Serum: AM Sched. Collection: 20-Apr-2021 04:30 (04-19-21 @ 08:24)  C-Reactive Protein, Serum: AM Sched. Collection: 20-Apr-2021 04:30 (04-19-21 @ 08:24)  Magnesium, Serum: AM Sched. Collection: 20-Apr-2021 04:30 (04-19-21 @ 08:24)  Comprehensive Metabolic Panel: AM Sched. Collection: 20-Apr-2021 04:30 (04-19-21 @ 08:24)  Complete Blood Count + Automated Diff: AM Sched. Collection: 20-Apr-2021 04:30 (04-19-21 @ 08:24)    MEDICATIONS:  MEDICATIONS  (STANDING):  chlorhexidine 4% Liquid 1 Application(s) Topical <User Schedule>  citalopram 10 milliGRAM(s) Oral daily  dexAMETHasone  Injectable 6 milliGRAM(s) IV Push daily  dextrose 40% Gel 15 Gram(s) Oral once  dextrose 5%. 1000 milliLiter(s) (50 mL/Hr) IV Continuous <Continuous>  dextrose 5%. 1000 milliLiter(s) (100 mL/Hr) IV Continuous <Continuous>  dextrose 50% Injectable 25 Gram(s) IV Push once  dextrose 50% Injectable 12.5 Gram(s) IV Push once  dextrose 50% Injectable 25 Gram(s) IV Push once  enoxaparin Injectable 40 milliGRAM(s) SubCutaneous every 12 hours  glucagon  Injectable 1 milliGRAM(s) IntraMuscular once  insulin glargine Injectable (LANTUS) 25 Unit(s) SubCutaneous at bedtime  insulin lispro (ADMELOG) corrective regimen sliding scale   SubCutaneous three times a day before meals  insulin lispro Injectable (ADMELOG) 8 Unit(s) SubCutaneous three times a day before meals  losartan 50 milliGRAM(s) Oral daily  pantoprazole    Tablet 40 milliGRAM(s) Oral before breakfast    MEDICATIONS  (PRN):  acetaminophen   Tablet .. 650 milliGRAM(s) Oral every 6 hours PRN Temp greater or equal to 38C (100.4F), Moderate Pain (4 - 6)  guaifenesin/dextromethorphan  Syrup 10 milliLiter(s) Oral every 4 hours PRN Cough    HOME MEDICATIONS:  Albuterol (Eqv-Proventil HFA) 90 mcg/inh inhalation aerosol (04-12)  azithromycin 250 mg oral tablet (04-12)  citalopram 10 mg oral tablet (04-12)  losartan 50 mg oral tablet (04-12)  Promethazine DM 6.25 mg-15 mg/5 mL oral syrup (04-12)    REVIEW OF SYSTEMS:  All other review of systems is negative unless indicated above.     PHYSICAL EXAM:  GENERAL: NAD  HEENT: No Swelling  CHEST/LUNG: Good air entry, No wheezing  HEART: RRR, No murmurs  ABDOMEN: Soft, Bowel sounds present  EXTREMITIES:  No clubbing

## 2021-04-19 NOTE — DIETITIAN INITIAL EVALUATION ADULT. - OTHER CALCULATIONS
using IBW 56.8kg d/t obese BMI; Energy: 1710-1995kcal (30-35kcal -- d/t large difference btwn ABW/IBW + covid considered); Protein: 68-86g/kg (1.2-1.5g/kg -- same reason as before); Fluid: 1mL/kcal or LIP

## 2021-04-20 ENCOUNTER — TRANSCRIPTION ENCOUNTER (OUTPATIENT)
Age: 46
End: 2021-04-20

## 2021-04-20 VITALS
HEART RATE: 68 BPM | SYSTOLIC BLOOD PRESSURE: 132 MMHG | RESPIRATION RATE: 18 BRPM | OXYGEN SATURATION: 94 % | DIASTOLIC BLOOD PRESSURE: 61 MMHG | TEMPERATURE: 98 F

## 2021-04-20 LAB
ALBUMIN SERPL ELPH-MCNC: 3.5 G/DL — SIGNIFICANT CHANGE UP (ref 3.5–5.2)
ALP SERPL-CCNC: 52 U/L — SIGNIFICANT CHANGE UP (ref 30–115)
ALT FLD-CCNC: 121 U/L — HIGH (ref 0–41)
ANION GAP SERPL CALC-SCNC: 7 MMOL/L — SIGNIFICANT CHANGE UP (ref 7–14)
AST SERPL-CCNC: 49 U/L — HIGH (ref 0–41)
BASOPHILS # BLD AUTO: 0.01 K/UL — SIGNIFICANT CHANGE UP (ref 0–0.2)
BASOPHILS NFR BLD AUTO: 0.1 % — SIGNIFICANT CHANGE UP (ref 0–1)
BILIRUB SERPL-MCNC: 0.5 MG/DL — SIGNIFICANT CHANGE UP (ref 0.2–1.2)
BUN SERPL-MCNC: 20 MG/DL — SIGNIFICANT CHANGE UP (ref 10–20)
CALCIUM SERPL-MCNC: 8.8 MG/DL — SIGNIFICANT CHANGE UP (ref 8.5–10.1)
CHLORIDE SERPL-SCNC: 99 MMOL/L — SIGNIFICANT CHANGE UP (ref 98–110)
CO2 SERPL-SCNC: 29 MMOL/L — SIGNIFICANT CHANGE UP (ref 17–32)
CREAT SERPL-MCNC: 0.7 MG/DL — SIGNIFICANT CHANGE UP (ref 0.7–1.5)
CRP SERPL-MCNC: <3 MG/L — SIGNIFICANT CHANGE UP
D DIMER BLD IA.RAPID-MCNC: 133 NG/ML DDU — SIGNIFICANT CHANGE UP (ref 0–230)
EOSINOPHIL # BLD AUTO: 0.12 K/UL — SIGNIFICANT CHANGE UP (ref 0–0.7)
EOSINOPHIL NFR BLD AUTO: 1.4 % — SIGNIFICANT CHANGE UP (ref 0–8)
ERYTHROCYTE [SEDIMENTATION RATE] IN BLOOD: 12 MM/HR — SIGNIFICANT CHANGE UP (ref 0–20)
GLUCOSE BLDC GLUCOMTR-MCNC: 161 MG/DL — HIGH (ref 70–99)
GLUCOSE BLDC GLUCOMTR-MCNC: 266 MG/DL — HIGH (ref 70–99)
GLUCOSE SERPL-MCNC: 152 MG/DL — HIGH (ref 70–99)
HCT VFR BLD CALC: 37.9 % — SIGNIFICANT CHANGE UP (ref 37–47)
HGB BLD-MCNC: 12.5 G/DL — SIGNIFICANT CHANGE UP (ref 12–16)
IMM GRANULOCYTES NFR BLD AUTO: 0.7 % — HIGH (ref 0.1–0.3)
LYMPHOCYTES # BLD AUTO: 3.28 K/UL — SIGNIFICANT CHANGE UP (ref 1.2–3.4)
LYMPHOCYTES # BLD AUTO: 37.6 % — SIGNIFICANT CHANGE UP (ref 20.5–51.1)
MAGNESIUM SERPL-MCNC: 2 MG/DL — SIGNIFICANT CHANGE UP (ref 1.8–2.4)
MCHC RBC-ENTMCNC: 28.5 PG — SIGNIFICANT CHANGE UP (ref 27–31)
MCHC RBC-ENTMCNC: 33 G/DL — SIGNIFICANT CHANGE UP (ref 32–37)
MCV RBC AUTO: 86.5 FL — SIGNIFICANT CHANGE UP (ref 81–99)
MONOCYTES # BLD AUTO: 0.64 K/UL — HIGH (ref 0.1–0.6)
MONOCYTES NFR BLD AUTO: 7.3 % — SIGNIFICANT CHANGE UP (ref 1.7–9.3)
NEUTROPHILS # BLD AUTO: 4.62 K/UL — SIGNIFICANT CHANGE UP (ref 1.4–6.5)
NEUTROPHILS NFR BLD AUTO: 52.9 % — SIGNIFICANT CHANGE UP (ref 42.2–75.2)
NRBC # BLD: 0 /100 WBCS — SIGNIFICANT CHANGE UP (ref 0–0)
PLATELET # BLD AUTO: 335 K/UL — SIGNIFICANT CHANGE UP (ref 130–400)
POTASSIUM SERPL-MCNC: 4 MMOL/L — SIGNIFICANT CHANGE UP (ref 3.5–5)
POTASSIUM SERPL-SCNC: 4 MMOL/L — SIGNIFICANT CHANGE UP (ref 3.5–5)
PROCALCITONIN SERPL-MCNC: 0.02 NG/ML — SIGNIFICANT CHANGE UP (ref 0.02–0.1)
PROT SERPL-MCNC: 5.9 G/DL — LOW (ref 6–8)
RBC # BLD: 4.38 M/UL — SIGNIFICANT CHANGE UP (ref 4.2–5.4)
RBC # FLD: 12.7 % — SIGNIFICANT CHANGE UP (ref 11.5–14.5)
SODIUM SERPL-SCNC: 135 MMOL/L — SIGNIFICANT CHANGE UP (ref 135–146)
WBC # BLD: 8.73 K/UL — SIGNIFICANT CHANGE UP (ref 4.8–10.8)
WBC # FLD AUTO: 8.73 K/UL — SIGNIFICANT CHANGE UP (ref 4.8–10.8)

## 2021-04-20 PROCEDURE — 71045 X-RAY EXAM CHEST 1 VIEW: CPT | Mod: 26

## 2021-04-20 RX ORDER — PANTOPRAZOLE SODIUM 20 MG/1
1 TABLET, DELAYED RELEASE ORAL
Qty: 30 | Refills: 0
Start: 2021-04-20 | End: 2021-05-19

## 2021-04-20 RX ORDER — AZITHROMYCIN 500 MG/1
1 TABLET, FILM COATED ORAL
Qty: 0 | Refills: 0 | DISCHARGE

## 2021-04-20 RX ORDER — METFORMIN HYDROCHLORIDE 850 MG/1
1 TABLET ORAL
Qty: 0 | Refills: 0 | DISCHARGE

## 2021-04-20 RX ORDER — ALBUTEROL 90 UG/1
2 AEROSOL, METERED ORAL
Qty: 0 | Refills: 0 | DISCHARGE

## 2021-04-20 RX ORDER — DEXTROMETHORPHAN HYDROBROMIDE AND PROMETHAZINE HYDROCHLORIDE 15; 6.25 MG/5ML; MG/5ML
5 SYRUP ORAL
Qty: 0 | Refills: 0 | DISCHARGE

## 2021-04-20 RX ORDER — CITALOPRAM 10 MG/1
1 TABLET, FILM COATED ORAL
Qty: 0 | Refills: 0 | DISCHARGE

## 2021-04-20 RX ORDER — LOSARTAN POTASSIUM 100 MG/1
1 TABLET, FILM COATED ORAL
Qty: 0 | Refills: 0 | DISCHARGE

## 2021-04-20 RX ADMIN — CHLORHEXIDINE GLUCONATE 1 APPLICATION(S): 213 SOLUTION TOPICAL at 05:31

## 2021-04-20 RX ADMIN — Medication 1: at 08:11

## 2021-04-20 RX ADMIN — Medication 3: at 12:02

## 2021-04-20 RX ADMIN — LOSARTAN POTASSIUM 50 MILLIGRAM(S): 100 TABLET, FILM COATED ORAL at 05:31

## 2021-04-20 RX ADMIN — PANTOPRAZOLE SODIUM 40 MILLIGRAM(S): 20 TABLET, DELAYED RELEASE ORAL at 05:31

## 2021-04-20 RX ADMIN — Medication 6 MILLIGRAM(S): at 05:31

## 2021-04-20 RX ADMIN — Medication 8 UNIT(S): at 12:02

## 2021-04-20 RX ADMIN — CITALOPRAM 10 MILLIGRAM(S): 10 TABLET, FILM COATED ORAL at 11:09

## 2021-04-20 RX ADMIN — Medication 8 UNIT(S): at 08:11

## 2021-04-20 RX ADMIN — ENOXAPARIN SODIUM 40 MILLIGRAM(S): 100 INJECTION SUBCUTANEOUS at 05:31

## 2021-04-20 NOTE — CHART NOTE - NSCHARTNOTESELECT_GEN_ALL_CORE
communication/Event Note
communication/Event Note
Event Note
Transfer Note/Event Note
Upgrade/Transfer Note
communication/Event Note
communication/Event Note

## 2021-04-20 NOTE — PROGRESS NOTE ADULT - ASSESSMENT
45 Y F with pmh, HTN, COVID19 positive on 4/6/21 presents to ED with worsening shortness of breath. Admitted for acute hypoxemic respiratory failure due to COVID19 PNA.    #Acute hypoxemic respiratory failure due to COVID19 PNA  - mild improvement on O2 demand.    On HFNC 80%/ 60L sPO2 96%>> Titrate down as tolerated.   -Decadron 6mg IV daily  -Remdesivir 100mg daily  Ddimer: 123  CRP: 52  Procal: 0.11  Ferritin: 566   -Tylenol PRN, mucinex prn  - COVID antibody negative and t/s active;    S/P  plasma , toci     #HTN  -continue losartan    #DM  - monitor fs, on insulin regimen   - A1c 4/2021: 7.1    DVT PPx- LVX 40mg sq bid  GI PPx- Protonix 40mg po qam  Diet- DASH/TLC/CC  Dispo- From home,   Code- FULL CODE    #Progress Note Handoff  Pending (specify):  Clinical improvement  Family discussion: NA, d/w the patient at length at bedside  Disposition: Home_
45 Y F with pmh, HTN, COVID19 positive on 4/6/21 presents to ED with worsening shortness of breath. Admitted for acute hypoxemic respiratory failure due to COVID19 PNA.    #Acute hypoxemic respiratory failure due to COVID19 PNA  - saturating 96% on NRB, will wean O2 as tolerated.   -Decadron 6mg IV daily 10more days  -Remdesivir 100mg daily  -ID following; monitor off abx for now   - inflammatory markers:  Ddimer: 123  CRP: 52  Procal: 0.11  Ferritin: 566   -Tylenol PRN, mucinex prn  - COVID antibody negative and t/s active;     Awaiting  plasma     #HTN  -continue losartan    #DM  - monitor fs, on insulin regimen   - A1c 4/2021: 7.1    DVT PPx- LVX 40mg sq bid  GI PPx- Protonix 40mg po qam  Diet- DASH/TLC/CC  Dispo- From home,   Code- FULL CODE    #Progress Note Handoff  Pending (specify):  Clinical impropvement  Family discussion: NA, d/w the patient at length at bedside  Disposition: Home_
45 Y F with pmh, HTN, COVID19 positive on 4/6/21 presents to ED with worsening shortness of breath. Admitted for acute hypoxemic respiratory failure due to COVID19 PNA.    #Acute hypoxemic respiratory failure due to COVID19 PNA  -Titrate supplemental O2 to maintain SpO2 92-96%; placed on HFNC 60L, 90% w/ NRB  -Decadron 6mg IV daily; Remdesivir 100mg daily  - inflammatory markers:  Ddimer: 123 > 151  CRP: 52 > 42  Procal: 0.11 > 0.06  Ferritin: 566 > 867  -Tylenol PRN, mucinex prn  - was supposed to be transferred to [facility] but patient was complaining of pleuritic chest pain and on non-rebreather     - EKG neg for ischemia   - va duplex neg for DVT  - received convalescent plasma 4/14  - patient accepted for step down CEU as per pulm  - s/p toci 800mg IV  - will repeat inflammatory markers  - f/u ID     #HTN  -continue losartan    #DM  - monitor fs, on insulin regimen   - A1c 4/2021: 7.1    DVT PPx- LVX 40mg sq bid  GI PPx- Protonix 40mg po qam  Diet- DASH/TLC/CC  Dispo- from home  Code- FULL CODE          
45 Y F with pmh, HTN, COVID19 positive on 4/6/21 presents to ED with worsening shortness of breath. Admitted for acute hypoxemic respiratory failure due to COVID19 PNA.    #Acute hypoxemic respiratory failure due to COVID19 PNA  -Titrate supplemental O2 to maintain SpO2 92-96%; saturating 96% on NRB, will wean O2   -Decadron 6mg IV daily 10more days  -Remdesivir 100mg daily  -ID following; monitor off abx for now   - inflammatory markers:  Ddimer: 123  CRP: 52  Procal: 0.11  Ferritin: 566   -Tylenol PRN, mucinex prn  - was supposed to be transferred to [facility] but patient was complaining of pleuritic chest pain and on non-rebreather     - EKG neg for ischemia   - will repeat inflammatory markers and do venous duplex b/l for DVT   - COVID antibody negative and t/s active; can do plasma     #HTN  -continue losartan    #DM  - monitor fs, on insulin regimen   - A1c 4/2021: 7.1    DVT PPx- LVX 40mg sq bid  GI PPx- Protonix 40mg po qam  Diet- DASH/TLC/CC  Dispo- From home, no needs; trend inflam markers, possible plasma, va duplex   Code- FULL CODE      
45 Y F with pmh, HTN, COVID19 positive on 4/6/21 presents to ED with worsening shortness of breath. Admitted for acute hypoxemic respiratory failure due to COVID19 PNA.    #Acute hypoxemic respiratory failure due to COVID19 PNA  On HFNC 60L/40%L sPO2 97%  Completed RDV therapy (04/16)  s/p plasma, toci  -Cont Decadron 6mg IV daily  - Wean down oxygen requirements as tolerated  - Monitor inflammatory markers  - Transition to NC today if possible    #HTN  -continue losartan 50mg qD    #DM  - monitor fs, on insulin regimen lantus/lispro 25-8-8-8  - A1c 4/2021: 7.1    #Progress Note Handoff  Pending (specify): Weaning off HFNC  Family discussion: d/w pt regarding weaning down oxygen requirements  Disposition: Home
IMPRESSION:    Acute hypoxemic respiratory failure  Severe COVID pneumonia SP TOCI and Plasma   Doubt bacterial superinfection  HO DM, HTN  Morbid obesity    PLAN:    CNS:  No depressants     HEENT: Oral care    PULMONARY:  HOB @ 45 degrees.  Aspiration precautions.  Wean o2.  Encourage incentive spirometry  NIV during sleep.  Dexa D#6      CARDIOVASCULAR:  I=O.  Avoid volume overload     GI: GI prophylaxis.  Feeding.  Bowel regimen     RENAL:  Follow up lytes.  Correct as needed    INFECTIOUS DISEASE: Follow up cultures.  Monitor off ABX     HEMATOLOGICAL:  DVT prophylaxis.  LMWH     ENDOCRINE:  Follow up FS.  Insulin protocol if needed.    MUSCULOSKELETAL:  Bed chair position     CEU     
IMPRESSION:    Acute hypoxemic respiratory failure on HHFNC  Severe COVID pneumonia SP TOCI and Plasma   HO DM, HTN  Morbid obesity    PLAN:    CNS:  No depressants     HEENT: Oral care    PULMONARY:  HOB @ 45 degrees.  Aspiration precautions.  Wean o2.  Encourage incentive spirometry  NIV during sleep.  Dexa , repeat CXR    CARDIOVASCULAR:  I=O.  Avoid volume overload     GI: GI prophylaxis.  Feeding.  Bowel regimen     RENAL:  Follow up lytes.  Correct as needed    INFECTIOUS DISEASE: Trend markers    HEMATOLOGICAL:  DVT prophylaxis.  LMWH     ENDOCRINE:  Follow up FS.  Insulin protocol if needed.    MUSCULOSKELETAL:  Bed chair position     CEU     
45 Y F with pmh, HTN, COVID19 positive on 4/6/21 presents to ED with worsening shortness of breath. Admitted for acute hypoxemic respiratory failure due to COVID19 PNA.    #Acute hypoxemic respiratory failure due to COVID19 PNA  - Increased O2 demand >> s/p  NRB >> HFNC 100/60 sPO2 96%     Being upgraded to SDU./    -Decadron 6mg IV daily  -Remdesivir 100mg daily  -ID following; monitor off abx for now   - inflammatory markers:  Ddimer: 123  CRP: 52  Procal: 0.11  Ferritin: 566   -Tylenol PRN, mucinex prn  - COVID antibody negative and t/s active;    S/P  plasma , toci     #HTN  -continue losartan    #DM  - monitor fs, on insulin regimen   - A1c 4/2021: 7.1    DVT PPx- LVX 40mg sq bid  GI PPx- Protonix 40mg po qam  Diet- DASH/TLC/CC  Dispo- From home,   Code- FULL CODE    #Progress Note Handoff  Pending (specify):  Clinical improvement  Family discussion: NA, d/w the patient at length at bedside  Disposition: Home_
45 Y F with pmh, HTN, COVID19 positive on 4/6/21 presents to ED with worsening shortness of breath. Admitted for acute hypoxemic respiratory failure due to COVID19 PNA.    #Acute hypoxemic respiratory failure due to COVID19 PNA  On HFNC 80%/55L sPO2 95%  Completed RDV therapy (04/16)  s/p plasma, toci  -Cont Decadron 6mg IV daily  - Wean down oxygen requirements as tolerated  - Monitor inflammatory markers    #HTN  -continue losartan 50mg qD    #DM  - monitor fs, on insulin regimen lantus/lispro 25-8-8-8  - A1c 4/2021: 7.1    #Progress Note Handoff  Pending (specify): Weaning off HFNC  Family discussion: d/w pt regarding weaning down oxygen requirements  Disposition: Home
45 Y F with pmh, HTN, COVID19 positive on 4/6/21 presents to ED with worsening shortness of breath. Admitted for acute hypoxemic respiratory failure due to COVID19 PNA.    #Acute hypoxemic respiratory failure due to COVID19 PNA  On NC 3L sPO2 95%  Completed RDV therapy (04/16)  s/p plasma, toci  -Cont Decadron 6mg IV daily  - Wean down oxygen requirements as tolerated  - Monitor inflammatory markers  - DC planning 24h, likely needs home O2    #HTN  -continue losartan 50mg qD    #DM  - monitor fs, on insulin regimen lantus/lispro 25-8-8-8  - A1c 4/2021: 7.1    #Progress Note Handoff  Pending (specify): Monitoring oxygen sat, will likely need home O2  Family discussion: d/w pt regarding possible DC tomorrow  Disposition: Home
  45 Y F with pmh, HTN, COVID19 positive on 4/6/21 presents to ED with worsening shortness of breath. Per patient, she had exposure to COVID on 4/3, ended up testing positive on 4/6. On 4/6, she began having cough, congestion, myalgias, fatigue, and subjective fevers. Went to PMD, started on z-pack (completed course) which helped her feel better slightly. However, last night she began having worsening shortness of breath and dry cough, to the point that she was vomiting from coughing, so decided to come to ED. Denies chills, chest pain, palpitations, abdominal pain. Reports diarrhea, but only after she took a laxative.     #Acute hypoxic respiratory failure secondary to covid 19   dexamethasone   appreciate id consult    cw oxygen , and titrate as tolerated     #Class 3 obesity BMI 40 patient needs to see dieitian outpatient for further evaluation     #Diabetes Mellitus   on insulin   CAPILLARY BLOOD GLUCOSE      POCT Blood Glucose.: 276 mg/dL (13 Apr 2021 11:27)  POCT Blood Glucose.: 183 mg/dL (13 Apr 2021 07:35)  POCT Blood Glucose.: 192 mg/dL (12 Apr 2021 22:25)  controlled     #Anemia no indication for transfusion     #HTN  BP: 123/63 (13 Apr 2021 05:00) (123/63 - 140/87)  controlled     Progress Note Handoff    Pending: titrate oxygen as tolerated , pt rehab , will observe if able to titrate down oxygen over next 24 - 48 hours , if unable will arrange for home oxygen     Family discussion: patient verbalized understanding and agreeable to plan of care     Disposition: Home___   
45 Y F with pmh, HTN, COVID19 positive on 4/6/21 presents to ED with worsening shortness of breath. Admitted for acute hypoxemic respiratory failure due to COVID19 PNA.    #Acute hypoxemic respiratory failure due to COVID19 PNA  On NC 3L sPO2 95%. Ambulating RA, pt desaturated to 86%  Completed RDV therapy (04/16)  s/p plasma, toci  -Cont Decadron 6mg IV daily  - Wean down oxygen requirements as tolerated  - Monitor inflammatory markers  - DC planning today on home O2, pending oxygen delivery    #HTN  -continue losartan 50mg qD    #DM  - monitor fs, on insulin regimen lantus/lispro 25-8-8-8  - A1c 4/2021: 7.1    DC planning
ASSESSMENT  46 yo F with  HTN, COVID19 positive on 4/6/21 presents to ED with worsening shortness of breath. Per patient, she had exposure to COVID on 4/3, ended up testing positive on 4/6. On 4/6, she began having cough, congestion, myalgias, fatigue, and subjective fevers. Went to PMD, started on z-pack (completed course)       IMPRESSION  #COVID19 PNA, Severe (O2 < or = 94% on RA and requiring supplemental O2)    CXR diffuse bilateral opacities     COVID-19 [place] Domain AB Interp: Negative (04-13-21 @ 06:50)    Admission WBC 4    Exposed 4/3, + 4/6    C-Reactive Protein, Serum: 52 mg/L (04-12-21 @ 15:00)    D-Dimer Assay, Quantitative: 123 ng/mL DDU (04-12-21 @ 15:00)  #Sepsis on admission  T>101F, Pulse>90  #Morbid Obesity BMI (kg/m2): 40    Creatinine, Serum: 0.7 (04-12-21 @ 15:00)      RECOMMENDATIONS  - Remdesivir 5 days  - s/p convalescent plasma (cannot receive vaccine x 90 days)  - s/p Tocilizumab   - Dexamethasone 6mg x 10 days or until Discharge (whichever is shorter), any taper per Pulm  - A/C per primary team  - Prone positioning if possible  - Monitor off abx    If any questions, please call or send a message on [employer] Teams  Spectra [phone]  
IMPRESSION:    Acute hypoxemic respiratory failure  Severe COVID pneumonia SP TOCI and Plasma   Doubt bacterial superinfection  HO DM, HTN  Morbid obesity    PLAN:    CNS:  No depressants     HEENT: Oral care    PULMONARY:  HOB @ 45 degrees.  Aspiration precautions.  Wean o2.  Encourage incentive spirometry  NIV during sleep.  Dexa D#5      CARDIOVASCULAR:  I=O.  Avoid volume overload     GI: GI prophylaxis.  Feeding.  Bowel regimen     RENAL:  Follow up lytes.  Correct as needed    INFECTIOUS DISEASE: Follow up cultures.  Monitor off ABX     HEMATOLOGICAL:  DVT prophylaxis.  LMWH     ENDOCRINE:  Follow up FS.  Insulin protocol if needed.    MUSCULOSKELETAL:  Bed chair position     CEU     
IMPRESSION:    Acute hypoxemic respiratory failure improving   Severe COVID pneumonia SP TOCI and Plasma   HO DM, HTN  Morbid obesity    PLAN:    CNS:  No depressants     HEENT: Oral care    PULMONARY:  HOB @ 45 degrees.  Aspiration precautions.  Wean O2 as tolerated.  Encourage incentive spirometry.  NIV during sleep.  Dexa 6 mg daily D#8.      CARDIOVASCULAR:  I=O.  Avoid volume overload     GI: GI prophylaxis.  Feeding.  Bowel regimen     RENAL:  Follow up lytes.  Correct as needed    INFECTIOUS DISEASE: Trend markers    HEMATOLOGICAL:  DVT prophylaxis.  LMWH     ENDOCRINE:  Follow up FS.  Insulin protocol if needed.    MUSCULOSKELETAL:  OOB to chair.  PT OT     Downgrade to med surg.     
45 Y F with pmh, HTN, COVID19 positive on 4/6/21 presents to ED with worsening shortness of breath. Admitted for acute hypoxemic respiratory failure due to COVID19 PNA.    #Acute hypoxemic respiratory failure due to COVID19 PNA  -saturating well on 3L NC; Titrate supplemental O2 to maintain SpO2 92-96%  -Patient started on Decadron 6mg IV daily 10more days  -Remdesivir 200mg now then 100mg taper   -ID following; monitor off abx for now   - will get type and screen for possible plasma   -Please follow up inflammatory markers  Ddimer: 123  CRP: 52  Procal: pending  Ferritin: pending   -Tylenol PRN, mucinex prn  - transfer to [facility]     #HTN  -continue losartan    #DM  - monitor fs, on insulin regimen   - A1c 4/2021: 7.1    #DVT PPx- LVX 40mg sq bid  #GI PPx- Protonix 40mg po qam  #Diet- DASH/TLC/CC  #Dispo- Acute; transfer to [facility]   #Code- FULL CODE      
ASSESSMENT  46 yo F with  HTN, COVID19 positive on 4/6/21 presents to ED with worsening shortness of breath. Per patient, she had exposure to COVID on 4/3, ended up testing positive on 4/6. On 4/6, she began having cough, congestion, myalgias, fatigue, and subjective fevers. Went to PMD, started on z-pack (completed course)       IMPRESSION  #COVID19 PNA, Severe (O2 < or = 94% on RA and requiring supplemental O2)    CXR diffuse bilateral opacities     COVID-19 [place] Domain AB Interp: Negative (04-13-21 @ 06:50)    Admission WBC 4    Exposed 4/3, + 4/6    C-Reactive Protein, Serum: 52 mg/L (04-12-21 @ 15:00)    D-Dimer Assay, Quantitative: 123 ng/mL DDU (04-12-21 @ 15:00)  #Sepsis on admission  T>101F, Pulse>90  #Morbid Obesity BMI (kg/m2): 40    Creatinine, Serum: 0.7 (04-12-21 @ 15:00)      RECOMMENDATIONS  - Remdesivir D1: 200mg x1, Day 2 - Day 5 100mg IV daily. Monitor Cr/LFTs  - s/p plasma  - s/p Tocilizumab   - Dexamethasone 6mg x 10 days or until Discharge (whichever is shorter), any taper per Pulm  - A/C per primary team  - Prone positioning if possible  - Monitor off abx    If any questions, please call or send a message on [employer] Teams  Spectra [phone]  
ASSESSMENT  46 yo F with  HTN, COVID19 positive on 4/6/21 presents to ED with worsening shortness of breath. Per patient, she had exposure to COVID on 4/3, ended up testing positive on 4/6. On 4/6, she began having cough, congestion, myalgias, fatigue, and subjective fevers. Went to PMD, started on z-pack (completed course)       IMPRESSION  #COVID19 PNA, Severe (O2 < or = 94% on RA and requiring supplemental O2)    CXR diffuse bilateral opacities     COVID-19 [place] Domain AB Interp: Negative (04-13-21 @ 06:50)    Admission WBC 4    Exposed 4/3, + 4/6    C-Reactive Protein, Serum: 52 mg/L (04-12-21 @ 15:00)    D-Dimer Assay, Quantitative: 123 ng/mL DDU (04-12-21 @ 15:00)  #Sepsis on admission  T>101F, Pulse>90  #Morbid Obesity BMI (kg/m2): 40    Creatinine, Serum: 0.7 (04-12-21 @ 15:00)      RECOMMENDATIONS  - Remdesivir D1: 200mg x1, Day 2 - Day 5 100mg IV daily. Monitor Cr/LFTs  - Consent for convalescent Plasma: 1 unit over 2h  (Please  patient- will not be able to receive vaccine x 90 days)  - Dexamethasone 6mg x 10 days or until Discharge (whichever is shorter), any taper per Pulm  - IF requiring HFNC or BIPAP, Tocilizumab 800mg x1  - A/C per primary team  - Prone positioning if possible  - Monitor off Abx , viral PNA  - Transfer [place] when possible    If any questions, please call or send a message on [employer] Teams  Spectra [phone]

## 2021-04-20 NOTE — CHART NOTE - NSCHARTNOTEFT_GEN_A_CORE
I made rounds today with the treatment team including the hospitalist, residents,  nurses and  and discussed the patient's current medical status and discharge  planning needs, and reviewed the chart.    T(C): 36.7 (04-20-21 @ 05:04), Max: 36.7 (04-20-21 @ 05:04)  HR: 68 (04-20-21 @ 05:04) (67 - 72)  BP: 117/63 (04-20-21 @ 05:04) (112/61 - 140/69)  RR: 18 (04-20-21 @ 05:04) (18 - 19)  SpO2: 92% (04-20-21 @ 09:28) (89% - 98%)          I reached out to the patient's health care proxy/ responsible family member-           [     ]  I reached                      and discussed the patient's medical condition,                   family concerns, and discharge planning           [     ]  I left a message with family               [  x   ]  I personally participated in rounds with the medical team and my resident and discussed the case. My resident reached                   family member/ HCP    Yan ( son ) [phone]             under my direction and supervision  and we reviewed the conversation          [     ]  My resident left a message with family under my direction and supervision                [     ]   My resident attended rounds and called the family     The following was discussed: updated medical status          [     ] I spent 5-10 minutes on the above discussing medical issues with team members and family and/ or my resident    [ x    ] I spent 11-20 minutes on the above discussing medical issues with team members and family and/ or my resident    [     ] I spent 21-30 minutes on the above discussing medical issues with team members and family and/ or my resident
I made rounds today with the treatment team including the hospitalist, residents,  nurses and  and discussed the patient's current medical status and discharge  planning needs, and reviewed the chart.    T(C): 37.3 (04-13-21 @ 07:11), Max: 38.6 (04-12-21 @ 17:54)  HR: 102 (04-13-21 @ 05:00) (83 - 112)  BP: 123/63 (04-13-21 @ 05:00) (123/63 - 140/87)  RR: 18 (04-13-21 @ 05:00) (18 - 22)  SpO2: 93% (04-13-21 @ 07:33) (89% - 99%)          I reached out to the patient's health care proxy/ responsible family member-           [     ]  I reached                      and discussed the patient's medical condition,                   family concerns, and discharge planning           [     ]  I left a message with family               [  x   ]  I personally participated in rounds with the medical team and my resident and discussed the case. My resident reached                   family member/ HCP     Yan ( father ) [phone]            under my direction and supervision  and we reviewed the conversation          [     ]  My resident left a message with family under my direction and supervision                [     ]   My resident attended rounds and called the family     The following was discussed: updated medical status          [     ] I spent 5-10 minutes on the above discussing medical issues with team members and family and/ or my resident    [    x ] I spent 11-20 minutes on the above discussing medical issues with team members and family and/ or my resident    [     ] I spent 21-30 minutes on the above discussing medical issues with team members and family and/ or my resident
ICU DOWNGRADE NOTE:    45y Female transferred to floor from CEU   Patient is a 45y old Female who presents with a chief complaint of shortness of breath (19 Apr 2021 06:38)  The patient is currently admitted for the primary diagnosis of COVID-19      The patient was admitted to the unit for (7) Days.    The patient was never intubated and was not on pressors.     Disposition: Med surg    Code Status: Full code    ICU COURSE OF EVENTS:  -------------------------------------------------------------------------------------------  pt was admitted for severe Covid PNA.pt was on HFNC, transitioned to NC 4 L. stable, ready for downgrade.        45 Y F with pmh, HTN, COVID19 positive on 4/6/21 presents to ED with worsening shortness of breath. Admitted for acute hypoxemic respiratory failure due to COVID19 PNA.    #Acute hypoxemic respiratory failure due to COVID19 PNA  - was on HFNC 60L/40%L sPO2 97%, now on NC 4 L  Completed RDV therapy (04/16)  s/p plasma, toci  -Cont Decadron 6mg IV daily  - Wean down oxygen requirements as tolerated  - Monitor inflammatory markers    #HTN  -continue losartan 50mg qD    #DM  - monitor fs, on insulin regimen lantus/lispro 25-8-8-8  - A1c 4/2021: 7.1
Pt requires home oxygen due to COVID-19 PNA. IV antibiotics/anti-virals have been tried, along with steroids and have been unsuccessful. Pt is otherwise in a chronic stable state.    Pt is aware he/she will be discharged on oxygen and is agreeable. Pt will require concentrator and portable O2 to the home.    Oxygen saturation on room air at rest is 90%    Oxygen saturation on room air on ambulation decreases to 86%    Oxygen saturation ambulating with nasal cannula O2 @ 3L/min improves to 95%
Transfer Note: Medical Floor to Intensive Care     Transfer from: 3A  Transfer to: CEU    Accepting Physician: Dr. Chavez       HPI / HOSPITAL COURSE:    45 Y F with pmh, HTN, COVID19 positive on 4/6/21 presents to ED with worsening shortness of breath. Per patient, she had exposure to COVID on 4/3, ended up testing positive on 4/6. On 4/6, she began having cough, congestion, myalgias, fatigue, and subjective fevers. Went to PMD, started on z-pack (completed course) which helped her feel better slightly. However, last night she began having worsening shortness of breath and dry cough, to the point that she was vomiting from coughing, so decided to come to ED. Denies chills, chest pain, palpitations, abdominal pain. Reports diarrhea, but only after she took a laxative.     ED Course:  T 99.4F, P 107, /87, R 20, S 93% on RA at rest. Desaturates to 89% on RA on ambulation, put on 3L NC and improved to 99%.     Hospital course:   Patient was initially saturating well on nasal cannula but started requiring Non-rebreather. She also complained of pleuritic chest pain and EKG was negative. She received convalescent plasma 4/14. Va duplex neg for DVT. She was placed on HFNC 60L 100% w/ non-rebreather on top and is accepted for stepdown. Hemodynamically stable.         Vital Signs Last 24 Hrs  T(C): 36.6 (15 Apr 2021 05:00), Max: 36.9 (14 Apr 2021 21:30)  T(F): 97.8 (15 Apr 2021 05:00), Max: 98.5 (14 Apr 2021 21:30)  HR: 77 (15 Apr 2021 05:00) (77 - 82)  BP: 120/77 (15 Apr 2021 05:00) (116/62 - 147/77)  BP(mean): --  RR: 18 (15 Apr 2021 05:00) (18 - 19)  SpO2: 90% (15 Apr 2021 09:03) (87% - 96%)    I&O's Summary    14 Apr 2021 07:01  -  15 Apr 2021 07:00  --------------------------------------------------------  IN: 0 mL / OUT: 400 mL / NET: -400 mL        Physical Exam:   General: NAD, awake and alert  Cardiac: RRR, no murmur, no rub, no gallop  Respiratory: Good air exchange bilaterally, no wheezes, no crackles  GI: Abdomen nondistended, nontender, bowel sounds present  Neuro: AAOx3, no tremors, no fasciculations     LABS:   CARDIAC MARKERS ( 14 Apr 2021 11:53 )  x     / <0.01 ng/mL / x     / x     / x                                  13.6   6.26  )-----------( 234      ( 15 Apr 2021 06:24 )             42.4       04-15    142  |  101  |  22<H>  ----------------------------<  162<H>  3.9   |  28  |  0.8    Ca    8.9      15 Apr 2021 06:24  Mg     2.1     04-15    TPro  7.2  /  Alb  4.0  /  TBili  0.4  /  DBili  x   /  AST  104<H>  /  ALT  82<H>  /  AlkPhos  58  04-15          ABG - ( 15 Apr 2021 09:30 )  pH, Arterial: 7.47  pH, Blood: x     /  pCO2: 44    /  pO2: 79    / HCO3: 32    / Base Excess: 7.6   /  SaO2: 96              ASSESSMENT & PLAN:    45 Y F with pmh, HTN, COVID19 positive on 4/6/21 presents to ED with worsening shortness of breath. Admitted for acute hypoxemic respiratory failure due to COVID19 PNA.    #Acute hypoxemic respiratory failure due to COVID19 PNA  -Titrate supplemental O2 to maintain SpO2 92-96%; placed on HFNC 60L, 100% w/ NRB  -Decadron 6mg IV daily; Remdesivir 100mg daily  - inflammatory markers:  Ddimer: 123 > 151  CRP: 52 > 42  Procal: 0.11 > 0.06  Ferritin: 566 > 867  -Tylenol PRN, mucinex prn  - was supposed to be transferred [facility] but patient was complaining of pleuritic chest pain and on non-rebreather     - EKG neg for ischemia   - va duplex neg for DVT  - received convalescent plasma 4/14  - patient accepted for step down CEU as per pulm  - will do toci 800mg IV  - will repeat inflammatory markers  - f/u ID     #HTN  -continue losartan    #DM  - monitor fs, on insulin regimen   - A1c 4/2021: 7.1    DVT PPx- LVX 40mg sq bid  GI PPx- Protonix 40mg po qam  Diet- DASH/TLC/CC  Dispo- upgrade to step down   Code- FULL CODE    Follow up:   - CXR tmrw, inflammatory markers tmrw   - f/u ABG   - placed order for toci today  - f/u ID
Was called by nurse because patient was having chest pain and increased difficulty breathing. Pt was on O2 NC 4L, saturating high 80s with minimal improvement when O2 inc to 6L. Nurse placed pt on NRB.  Upon assessment of pt, she was sitting up in bed, on NRB, saturating 98%. Appears to have mild increased work of breathing. Pt states that she has lower, anterior chest pain and well as mid back pain, worse on inspiration, with coughing, pressure-like, began after she ambulated to bathroom and back to bed. States that this has been occurring often after ambulation or exertion since the weekend. Had previously taken Tylenol for the pain and it helped alleviate the pain. States that she feels slight improvement in dyspnea and chest pain after being placed on NRB.    Physical Exam:  General: Mild distress, on NRB  Pulmonary: Ant chest tender, rales in bilateral bases  Heart: Regular rhythm, inc rate  Abdomen: Soft, nontender, nondistended    Pt states that she wishes to take an anti-inflammatory to see if it helps alleviate pain. Will order 1 time dose of Ibuprofen 400 mg. Can continue taking PRN Tylenol as well.
chest pain . Does not sound like ACS , given 400 mg ibuprophen and ordered EKG
I made rounds today with the treatment team including the hospitalist, residents,  nurses and  and discussed the patient's current medical status and discharge  planning needs, and reviewed the chart.    T(C): 36.6 (04-15-21 @ 05:00), Max: 36.9 (04-14-21 @ 21:30)  HR: 77 (04-15-21 @ 09:50) (77 - 82)  BP: 120/77 (04-15-21 @ 05:00) (116/62 - 147/77)  RR: 18 (04-15-21 @ 05:00) (18 - 19)  SpO2: 96% (04-15-21 @ 09:50) (87% - 96%)          I reached out to the patient's health care proxy/ responsible family member-           [     ]  I reached                      and discussed the patient's medical condition,                   family concerns, and discharge planning           [     ]  I left a message with family               [   x  ]  I personally participated in rounds with the medical team and my resident and discussed the case. My resident reached                   family member/ HCP     Yan ( father ) [phone]            under my direction and supervision  and we reviewed the conversation          [     ]  My resident left a message with family under my direction and supervision                [     ]   My resident attended rounds and called the family     The following was discussed: updated medical status          [     ] I spent 5-10 minutes on the above discussing medical issues with team members and family and/ or my resident    [  x   ] I spent 11-20 minutes on the above discussing medical issues with team members and family and/ or my resident    [     ] I spent 21-30 minutes on the above discussing medical issues with team members and family and/ or my resident
I made rounds today with the treatment team including the hospitalist, residents,  nurses and  and discussed the patient's current medical status and discharge  planning needs, and reviewed the chart.    T(C): 36.9 (04-14-21 @ 05:00), Max: 36.9 (04-13-21 @ 12:35)  HR: 83 (04-14-21 @ 05:00) (83 - 93)  BP: 127/75 (04-14-21 @ 05:00) (127/75 - 139/85)  RR: 18 (04-14-21 @ 05:00) (18 - 18)  SpO2: 95% (04-14-21 @ 05:00) (95% - 98%)          I reached out to the patient's health care proxy/ responsible family member-           [     ]  I reached                      and discussed the patient's medical condition,                   family concerns, and discharge planning           [     ]  I left a message with family               [     ]  I personally participated in rounds with the medical team and my resident and discussed the case. My resident reached                   family member/ HCP                 under my direction and supervision  and we reviewed the conversation          [  x   ]  My resident left a message with family under my direction and supervision  - Yan ( father ) [phone]              [     ]   My resident attended rounds and called the family     The following was discussed:           [  x   ] I spent 5-10 minutes on the above discussing medical issues with team members and family and/ or my resident    [     ] I spent 11-20 minutes on the above discussing medical issues with team members and family and/ or my resident    [     ] I spent 21-30 minutes on the above discussing medical issues with team members and family and/ or my resident

## 2021-04-20 NOTE — DISCHARGE NOTE PROVIDER - NSDCMRMEDTOKEN_GEN_ALL_CORE_FT
Albuterol (Eqv-Proventil HFA) 90 mcg/inh inhalation aerosol: 2 puff(s) inhaled every 6 hours  citalopram 10 mg oral tablet: 1 tab(s) orally once a day  losartan 50 mg oral tablet: 1 tab(s) orally once a day  metFORMIN 500 mg oral tablet, extended release: 1 tab(s) orally once a day  Promethazine DM 6.25 mg-15 mg/5 mL oral syrup: 5 milliliter(s) orally every 6 hours   Albuterol (Eqv-Proventil HFA) 90 mcg/inh inhalation aerosol: 2 puff(s) inhaled every 6 hours  citalopram 10 mg oral tablet: 1 tab(s) orally once a day  losartan 50 mg oral tablet: 1 tab(s) orally once a day  metFORMIN 500 mg oral tablet, extended release: 1 tab(s) orally once a day  pantoprazole 40 mg oral delayed release tablet: 1 tab(s) orally once a day (before a meal)  Promethazine DM 6.25 mg-15 mg/5 mL oral syrup: 5 milliliter(s) orally every 6 hours  Protonix 40 mg oral delayed release tablet: 1 tab(s) orally once a day

## 2021-04-20 NOTE — PROGRESS NOTE ADULT - SUBJECTIVE AND OBJECTIVE BOX
LIZ PAULSON  45y, Female  Allergy: No Known Allergies    Hospital Day: 8d    Patient seen and examined earlier today. No acute events overnight.    PMH/PSH:  PAST MEDICAL & SURGICAL HISTORY:  Hypertension    Diabetes mellitus    VITALS:  T(F): 97.7 (04-20-21 @ 12:11), Max: 98.1 (04-20-21 @ 05:04)  HR: 68 (04-20-21 @ 12:11)  BP: 132/61 (04-20-21 @ 12:11) (112/61 - 132/61)  RR: 18 (04-20-21 @ 12:11)  SpO2: 94% (04-20-21 @ 12:11)    TESTS & MEASUREMENTS:  Weight (Kg): 109 (04-19-21 @ 10:43)  BMI (kg/m2): 40 (04-19)    04-18-21 @ 07:01  -  04-19-21 @ 07:00  --------------------------------------------------------  IN: 0 mL / OUT: 600 mL / NET: -600 mL                        12.5   8.73  )-----------( 335      ( 20 Apr 2021 06:40 )             37.9     04-20    135  |  99  |  20  ----------------------------<  152<H>  4.0   |  29  |  0.7    Ca    8.8      20 Apr 2021 06:40  Mg     2.0     04-20    TPro  5.9<L>  /  Alb  3.5  /  TBili  0.5  /  DBili  x   /  AST  49<H>  /  ALT  121<H>  /  AlkPhos  52  04-20    LIVER FUNCTIONS - ( 20 Apr 2021 06:40 )  Alb: 3.5 g/dL / Pro: 5.9 g/dL / ALK PHOS: 52 U/L / ALT: 121 U/L / AST: 49 U/L / GGT: x           MEDICATIONS:  MEDICATIONS  (STANDING):  chlorhexidine 4% Liquid 1 Application(s) Topical <User Schedule>  citalopram 10 milliGRAM(s) Oral daily  dexAMETHasone  Injectable 6 milliGRAM(s) IV Push daily  dextrose 40% Gel 15 Gram(s) Oral once  dextrose 5%. 1000 milliLiter(s) (50 mL/Hr) IV Continuous <Continuous>  dextrose 5%. 1000 milliLiter(s) (100 mL/Hr) IV Continuous <Continuous>  dextrose 50% Injectable 25 Gram(s) IV Push once  dextrose 50% Injectable 12.5 Gram(s) IV Push once  dextrose 50% Injectable 25 Gram(s) IV Push once  enoxaparin Injectable 40 milliGRAM(s) SubCutaneous every 12 hours  glucagon  Injectable 1 milliGRAM(s) IntraMuscular once  insulin glargine Injectable (LANTUS) 25 Unit(s) SubCutaneous at bedtime  insulin lispro (ADMELOG) corrective regimen sliding scale   SubCutaneous three times a day before meals  insulin lispro Injectable (ADMELOG) 8 Unit(s) SubCutaneous three times a day before meals  losartan 50 milliGRAM(s) Oral daily  pantoprazole    Tablet 40 milliGRAM(s) Oral before breakfast    MEDICATIONS  (PRN):  acetaminophen   Tablet .. 650 milliGRAM(s) Oral every 6 hours PRN Temp greater or equal to 38C (100.4F), Moderate Pain (4 - 6)  guaifenesin/dextromethorphan  Syrup 10 milliLiter(s) Oral every 4 hours PRN Cough    HOME MEDICATIONS:  Albuterol (Eqv-Proventil HFA) 90 mcg/inh inhalation aerosol (04-12)  azithromycin 250 mg oral tablet (04-12)  citalopram 10 mg oral tablet (04-12)  losartan 50 mg oral tablet (04-12)  Promethazine DM 6.25 mg-15 mg/5 mL oral syrup (04-12)    REVIEW OF SYSTEMS:  All other review of systems is negative unless indicated above.     PHYSICAL EXAM:  GENERAL: NAD  HEENT: No Swelling  CHEST/LUNG: Good air entry, No wheezing  HEART: RRR, No murmurs  ABDOMEN: Soft, Bowel sounds present  EXTREMITIES:  No clubbing

## 2021-04-20 NOTE — DISCHARGE NOTE PROVIDER - CARE PROVIDER_API CALL
Gautam Reed  INFECTIOUS DISEASE  [address] [place] [id number]  Phone: [phone]  Fax: [phone]  Follow Up Time:

## 2021-04-20 NOTE — DISCHARGE NOTE NURSING/CASE MANAGEMENT/SOCIAL WORK - PATIENT PORTAL LINK FT
You can access the FollowMyHealth Patient Portal offered by [facility] by registering at the following website: [email or web address]. By joining [email or web address]’s FollowMyHealth portal, you will also be able to view your health information using other applications (apps) compatible with our system.

## 2021-04-20 NOTE — DISCHARGE NOTE PROVIDER - HOSPITAL COURSE
45 Y F with pmh, HTN, COVID19 positive on 4/6/21 presents to ED with worsening shortness of breath. Per patient, she had exposure to COVID on 4/3, ended up testing positive on 4/6. On 4/6, she began having cough, congestion, myalgias, fatigue, and subjective fevers. Went to PMD, started on z-pack (completed course) which helped her feel better slightly. However, last night she began having worsening shortness of breath and dry cough, to the point that she was vomiting from coughing, so decided to come to ED. Denies chills, chest pain, palpitations, abdominal pain. Reports diarrhea, but only after she took a laxative.     ED Course:  T 99.4F, P 107, /87, R 20, S 93% on RA at rest. Desaturates to 89% on RA on ambulation, put on 3L NC and improved to 99%. However patient was upgraded to IVU for increasing O2 requirements placed on high flow O2 and eventually titrated O2 down.       #Acute hypoxemic respiratory failure due to COVID19 PNA  Currently on NC 3L sPO2 95%. Ambulating RA, pt desaturated to 86%  Completed RDV therapy (04/16)  s/p plasma, toci  -Cont Decadron 6mg IV daily  - Wean down oxygen requirements as tolerated  - Monitor inflammatory markers  - DC planning today on home O2, pending oxygen delivery    #HTN  -continue losartan 50mg qD    #DM  - c/w home dose of metformin  - A1c 4/2021: 7.1

## 2021-04-20 NOTE — DISCHARGE NOTE PROVIDER - NSDCCPCAREPLAN_GEN_ALL_CORE_FT
PRINCIPAL DISCHARGE DIAGNOSIS  Diagnosis: COVID-19  Assessment and Plan of Treatment: Your worsening shortness of breath was due to COVID 19 Pneumonia. You were treated with steroid medication and antiviral and also plasma. Your oxygen requirments were monitored in ICU and they improved with meidcation. You are currently on 3L oxygen and can stay in room air without oxygen. Please contineue to use your home oxygen and follow all isolation protocol. Follow up with your PMD within one week. If you have any worsening fevers, leg pain, chest pain, sob, please return to the ED.

## 2021-04-20 NOTE — PROGRESS NOTE ADULT - NSICDXPILOT_GEN_ALL_CORE
[place]

## 2021-04-20 NOTE — PROGRESS NOTE ADULT - PROVIDER SPECIALTY LIST ADULT
Pulmonology
Pulmonology
Hospitalist
Pulmonology
Pulmonology
Hospitalist
Hospitalist
Infectious Disease
Internal Medicine
Hospitalist
Infectious Disease
Infectious Disease

## 2021-05-04 DIAGNOSIS — D64.9 ANEMIA, UNSPECIFIED: ICD-10-CM

## 2021-05-04 DIAGNOSIS — E66.01 MORBID (SEVERE) OBESITY DUE TO EXCESS CALORIES: ICD-10-CM

## 2021-05-04 DIAGNOSIS — Z87.891 PERSONAL HISTORY OF NICOTINE DEPENDENCE: ICD-10-CM

## 2021-05-04 DIAGNOSIS — Z97.5 PRESENCE OF (INTRAUTERINE) CONTRACEPTIVE DEVICE: ICD-10-CM

## 2021-05-04 DIAGNOSIS — R94.31 ABNORMAL ELECTROCARDIOGRAM [ECG] [EKG]: ICD-10-CM

## 2021-05-04 DIAGNOSIS — R19.7 DIARRHEA, UNSPECIFIED: ICD-10-CM

## 2021-05-04 DIAGNOSIS — R51.9 HEADACHE, UNSPECIFIED: ICD-10-CM

## 2021-05-04 DIAGNOSIS — A41.89 OTHER SPECIFIED SEPSIS: ICD-10-CM

## 2021-05-04 DIAGNOSIS — U07.1 COVID-19: ICD-10-CM

## 2021-05-04 DIAGNOSIS — R07.9 CHEST PAIN, UNSPECIFIED: ICD-10-CM

## 2021-05-04 DIAGNOSIS — Z79.84 LONG TERM (CURRENT) USE OF ORAL HYPOGLYCEMIC DRUGS: ICD-10-CM

## 2021-05-04 DIAGNOSIS — E11.65 TYPE 2 DIABETES MELLITUS WITH HYPERGLYCEMIA: ICD-10-CM

## 2021-05-04 DIAGNOSIS — J12.82 PNEUMONIA DUE TO CORONAVIRUS DISEASE 2019: ICD-10-CM

## 2021-05-04 DIAGNOSIS — J96.01 ACUTE RESPIRATORY FAILURE WITH HYPOXIA: ICD-10-CM

## 2021-05-04 DIAGNOSIS — R06.02 SHORTNESS OF BREATH: ICD-10-CM

## 2021-05-04 DIAGNOSIS — I10 ESSENTIAL (PRIMARY) HYPERTENSION: ICD-10-CM

## 2021-06-23 PROBLEM — E11.9 TYPE 2 DIABETES MELLITUS WITHOUT COMPLICATIONS: Chronic | Status: ACTIVE | Noted: 2021-04-12

## 2021-06-23 PROBLEM — I10 ESSENTIAL (PRIMARY) HYPERTENSION: Chronic | Status: ACTIVE | Noted: 2021-04-12

## 2021-06-30 ENCOUNTER — OUTPATIENT (OUTPATIENT)
Dept: OUTPATIENT SERVICES | Facility: HOSPITAL | Age: 46
LOS: 1 days | Discharge: HOME | End: 2021-06-30

## 2021-06-30 ENCOUNTER — APPOINTMENT (OUTPATIENT)
Dept: PODIATRY | Facility: CLINIC | Age: 46
End: 2021-06-30
Payer: COMMERCIAL

## 2021-06-30 PROCEDURE — 99213 OFFICE O/P EST LOW 20 MIN: CPT

## 2021-06-30 NOTE — HISTORY OF PRESENT ILLNESS
[FreeTextEntry1] : 45 year old female presents for diabetic foot care; callus to left foot \par and ingrowing toenails that are painful on ambulation\par A1C ~6% (4/2021) - Follow up with PCP in Sept 2021\par Denies any other pedal complaints

## 2021-06-30 NOTE — PROCEDURE
[] : Both right and left inserts were adjusted to accommodate deformity and offload pressure baring region. Modification  of inserts were performed with use of plastazote, PPT, cork and poron. [FreeTextEntry1] : DM care\par \par Hyperkeratosis debrided with curette forefoot sub 2nd met, left foot\par Slant back Ingrowing Toenails 1st digit B/L feet \par RTC in 6 months or sooner if needed\par

## 2021-06-30 NOTE — PHYSICAL EXAM
[2+] : left foot dorsalis pedis 2+ [No Joint Swelling] : no joint swelling [Normal Foot/Ankle] : Both lower extremities were exposed and visualized. Standing exam demonstrates normal foot posture and alignment. Hindfoot exam shows no hindfoot valgus or varus [Skin Lesions] : no skin lesions [Sensation] : the sensory exam was normal to light touch and pinprick [FreeTextEntry1] : Hyperkeratosis sub met 2nd digit - Left foot\par Ingrowing toenail; B/L 1st digit [Diminished Throughout Right Foot] : normal sensation with monofilament testing throughout right foot [Diminished Throughout Left Foot] : normal sensation with monofilament testing throughout left foot

## 2021-11-05 ENCOUNTER — OUTPATIENT (OUTPATIENT)
Dept: OUTPATIENT SERVICES | Facility: HOSPITAL | Age: 46
LOS: 1 days | Discharge: HOME | End: 2021-11-05

## 2021-11-05 ENCOUNTER — APPOINTMENT (OUTPATIENT)
Dept: PODIATRY | Facility: CLINIC | Age: 46
End: 2021-11-05
Payer: COMMERCIAL

## 2021-11-05 DIAGNOSIS — M79.671 PAIN IN RIGHT FOOT: ICD-10-CM

## 2021-11-05 DIAGNOSIS — B35.3 TINEA PEDIS: ICD-10-CM

## 2021-11-05 DIAGNOSIS — L29.9 PRURITUS, UNSPECIFIED: ICD-10-CM

## 2021-11-05 DIAGNOSIS — M79.672 PAIN IN RIGHT FOOT: ICD-10-CM

## 2021-11-05 PROCEDURE — 99213 OFFICE O/P EST LOW 20 MIN: CPT

## 2021-11-08 PROBLEM — L29.9 PRURITUS OF SKIN: Status: ACTIVE | Noted: 2021-01-26

## 2021-11-08 PROBLEM — B35.3 TINEA PEDIS: Status: ACTIVE | Noted: 2021-01-19

## 2021-11-08 PROBLEM — B35.3 TINEA PEDIS OF BOTH FEET: Status: ACTIVE | Noted: 2021-01-26

## 2021-11-08 PROBLEM — M79.671 PAIN OF BOTH HEELS: Status: ACTIVE | Noted: 2021-01-26

## 2021-11-08 NOTE — HISTORY OF PRESENT ILLNESS
[FreeTextEntry1] : 46 year old female presents for diabetic foot care; callus to left foot \par and ingrowing toenails that are painful on ambulation\par A1C ~5.9% (11/2021)\par Denies any other pedal complaints

## 2021-11-08 NOTE — PHYSICAL EXAM
[2+] : left foot dorsalis pedis 2+ [No Joint Swelling] : no joint swelling [Normal Foot/Ankle] : Both lower extremities were exposed and visualized. Standing exam demonstrates normal foot posture and alignment. Hindfoot exam shows no hindfoot valgus or varus [Skin Lesions] : no skin lesions [Sensation] : the sensory exam was normal to light touch and pinprick [Vibration Dec.] : diminished vibratory sensation at the level of the toes [FreeTextEntry1] : Hyperkeratosis sub met 2nd digit - Left foot\par  [Diminished Throughout Right Foot] : normal sensation with monofilament testing throughout right foot [Diminished Throughout Left Foot] : normal sensation with monofilament testing throughout left foot

## 2021-11-08 NOTE — PROCEDURE
[] : A mold was not applied. [FreeTextEntry1] : DM care\par \par Hyperkeratosis debrided with #15 blade down to and including healthy epidermal tissue forefoot sub 2nd met, left foot\par RTC in 2-3 months or sooner if needed\par

## 2021-11-17 DIAGNOSIS — L29.9 PRURITUS, UNSPECIFIED: ICD-10-CM

## 2021-11-17 DIAGNOSIS — B35.3 TINEA PEDIS: ICD-10-CM

## 2021-11-17 DIAGNOSIS — M79.671 PAIN IN RIGHT FOOT: ICD-10-CM

## 2022-02-12 ENCOUNTER — OUTPATIENT (OUTPATIENT)
Dept: OUTPATIENT SERVICES | Facility: HOSPITAL | Age: 47
LOS: 1 days | Discharge: HOME | End: 2022-02-12
Payer: COMMERCIAL

## 2022-02-12 DIAGNOSIS — Z12.31 ENCOUNTER FOR SCREENING MAMMOGRAM FOR MALIGNANT NEOPLASM OF BREAST: ICD-10-CM

## 2022-02-12 PROCEDURE — 77067 SCR MAMMO BI INCL CAD: CPT | Mod: 26

## 2022-02-12 PROCEDURE — 77063 BREAST TOMOSYNTHESIS BI: CPT | Mod: 26

## 2023-05-13 ENCOUNTER — OUTPATIENT (OUTPATIENT)
Dept: OUTPATIENT SERVICES | Facility: HOSPITAL | Age: 48
LOS: 1 days | End: 2023-05-13
Payer: COMMERCIAL

## 2023-05-13 DIAGNOSIS — Z12.31 ENCOUNTER FOR SCREENING MAMMOGRAM FOR MALIGNANT NEOPLASM OF BREAST: ICD-10-CM

## 2023-05-13 DIAGNOSIS — Z00.8 ENCOUNTER FOR OTHER GENERAL EXAMINATION: ICD-10-CM

## 2023-05-13 PROCEDURE — 77063 BREAST TOMOSYNTHESIS BI: CPT

## 2023-05-13 PROCEDURE — 77067 SCR MAMMO BI INCL CAD: CPT

## 2023-05-13 PROCEDURE — 77063 BREAST TOMOSYNTHESIS BI: CPT | Mod: 26

## 2023-05-13 PROCEDURE — 77067 SCR MAMMO BI INCL CAD: CPT | Mod: 26

## 2023-05-14 DIAGNOSIS — Z12.31 ENCOUNTER FOR SCREENING MAMMOGRAM FOR MALIGNANT NEOPLASM OF BREAST: ICD-10-CM

## 2023-10-26 ENCOUNTER — APPOINTMENT (OUTPATIENT)
Dept: NEUROSURGERY | Facility: CLINIC | Age: 48
End: 2023-10-26
Payer: COMMERCIAL

## 2023-10-26 DIAGNOSIS — M54.12 RADICULOPATHY, CERVICAL REGION: ICD-10-CM

## 2023-10-26 PROCEDURE — 99203 OFFICE O/P NEW LOW 30 MIN: CPT

## 2023-11-04 NOTE — ED ADULT TRIAGE NOTE - MEANS OF ARRIVAL
as of this dictation. EKG: When ordered, EKG's are interpreted by the Emergency Department Physician in the absence of a cardiologist.  Please see their note for interpretation of EKG. RADIOLOGY:   Non-plain film images such as CT, Ultrasound and MRI are read by the radiologist. Plain radiographic images are visualized and preliminarily interpreted by the ED Provider with the below findings:        Interpretation per the Radiologist below, if available at the time of this note:    No orders to display     No results found. No results found. PROCEDURES   Unless otherwise noted below, none     Incision/Drainage    Date/Time: 11/3/2023 8:00 PM    Performed by: Franklin Alvarenga PA-C  Authorized by: Franklin Alvarenga PA-C    Consent:     Consent obtained:  Verbal    Consent given by:  Patient    Risks, benefits, and alternatives were discussed: yes      Risks discussed:  Bleeding, infection and pain    Alternatives discussed:  Referral  [facility] protocol:     Procedure explained and questions answered to patient or proxy's satisfaction: yes      Patient identity confirmed:  Verbally with patient  Location:     Type:  Abscess    Size:  1 cm    Location:  Mouth    Mouth location: periapical gum next to the right upper lateral incisor and canine. Pre-procedure details:     Skin preparation:  Chlorhexidine  Anesthesia:     Anesthesia method:  Local infiltration    Local anesthetic:  Bupivacaine 0.25% w/o epi  Procedure type:     Complexity:  Simple  Procedure details:     Incision types:  Stab incision    Wound management:  Probed and deloculated    Drainage:  Purulent    Drainage amount:   Moderate    Wound treatment:  Wound left open  Post-procedure details:     Procedure completion:  Tolerated well, no immediate complications      CRITICAL CARE TIME (.cctime)   N/a    PAST MEDICAL HISTORY         Chronic Conditions affecting Care:  has a past medical history of Abnormal Pap smear of cervix, ambulatory

## 2023-11-27 ENCOUNTER — APPOINTMENT (OUTPATIENT)
Dept: NEUROSURGERY | Facility: CLINIC | Age: 48
End: 2023-11-27

## 2024-01-25 NOTE — PATIENT PROFILE ADULT - NSPROGENPREVTRANSF_GEN_A_NUR
Quality 130: Documentation Of Current Medications In The Medical Record: Current Medications Documented Detail Level: Detailed Quality 110: Preventive Care And Screening: Influenza Immunization: Influenza Immunization previously received during influenza season no
